# Patient Record
Sex: FEMALE | Race: WHITE | NOT HISPANIC OR LATINO | Employment: OTHER | URBAN - METROPOLITAN AREA
[De-identification: names, ages, dates, MRNs, and addresses within clinical notes are randomized per-mention and may not be internally consistent; named-entity substitution may affect disease eponyms.]

---

## 2024-01-30 ENCOUNTER — DOCUMENTATION ONLY (OUTPATIENT)
Dept: OTHER | Facility: CLINIC | Age: 74
End: 2024-01-30

## 2024-01-31 ENCOUNTER — DOCUMENTATION ONLY (OUTPATIENT)
Dept: GERIATRICS | Facility: CLINIC | Age: 74
End: 2024-01-31

## 2024-02-05 PROBLEM — K59.00 CONSTIPATION: Status: ACTIVE | Noted: 2021-09-29

## 2024-02-05 PROBLEM — F33.1 MODERATE EPISODE OF RECURRENT MAJOR DEPRESSIVE DISORDER (H): Status: ACTIVE | Noted: 2022-05-16

## 2024-02-05 PROBLEM — F41.9 ANXIETY: Status: ACTIVE | Noted: 2019-05-16

## 2024-02-05 PROBLEM — R05.3 CHRONIC COUGH: Status: ACTIVE | Noted: 2020-09-15

## 2024-02-05 PROBLEM — L72.9 SKIN CYST: Status: ACTIVE | Noted: 2017-07-21

## 2024-02-05 PROBLEM — R10.11 RUQ PAIN: Status: RESOLVED | Noted: 2023-05-02 | Resolved: 2024-02-05

## 2024-02-05 PROBLEM — R79.89 LOW VITAMIN B12 LEVEL: Status: ACTIVE | Noted: 2020-09-15

## 2024-02-05 PROBLEM — R63.4 WEIGHT LOSS: Status: ACTIVE | Noted: 2019-05-16

## 2024-02-05 PROBLEM — R13.10 DYSPHAGIA: Status: ACTIVE | Noted: 2022-08-17

## 2024-02-05 PROBLEM — G30.1 ALZHEIMER'S TYPE DEMENTIA WITH LATE ONSET WITHOUT BEHAVIORAL DISTURBANCE (H): Status: ACTIVE | Noted: 2020-06-02

## 2024-02-05 PROBLEM — N94.89 PELVIC CONGESTION: Status: ACTIVE | Noted: 2021-10-13

## 2024-02-05 PROBLEM — F02.80 ALZHEIMER'S TYPE DEMENTIA WITH LATE ONSET WITHOUT BEHAVIORAL DISTURBANCE (H): Status: ACTIVE | Noted: 2020-06-02

## 2024-02-05 PROBLEM — R73.01 ELEVATED FASTING BLOOD SUGAR: Status: ACTIVE | Noted: 2020-09-08

## 2024-02-05 PROBLEM — K64.8 INTERNAL HEMORRHOIDS: Status: ACTIVE | Noted: 2021-09-29

## 2024-02-05 PROBLEM — I10 PRIMARY HYPERTENSION: Status: ACTIVE | Noted: 2021-09-29

## 2024-02-06 ENCOUNTER — ASSISTED LIVING VISIT (OUTPATIENT)
Dept: GERIATRICS | Facility: CLINIC | Age: 74
End: 2024-02-06
Payer: COMMERCIAL

## 2024-02-06 DIAGNOSIS — R73.03 PREDIABETES: ICD-10-CM

## 2024-02-06 DIAGNOSIS — F41.9 ANXIETY: ICD-10-CM

## 2024-02-06 DIAGNOSIS — G30.1 ALZHEIMER'S TYPE DEMENTIA WITH LATE ONSET WITHOUT BEHAVIORAL DISTURBANCE (H): Primary | ICD-10-CM

## 2024-02-06 DIAGNOSIS — E55.9 VITAMIN D DEFICIENCY: ICD-10-CM

## 2024-02-06 DIAGNOSIS — R13.10 DYSPHAGIA, UNSPECIFIED TYPE: ICD-10-CM

## 2024-02-06 DIAGNOSIS — R63.4 WEIGHT LOSS: ICD-10-CM

## 2024-02-06 DIAGNOSIS — F33.1 MODERATE EPISODE OF RECURRENT MAJOR DEPRESSIVE DISORDER (H): ICD-10-CM

## 2024-02-06 DIAGNOSIS — E78.5 HYPERLIPIDEMIA, UNSPECIFIED HYPERLIPIDEMIA TYPE: ICD-10-CM

## 2024-02-06 DIAGNOSIS — I10 PRIMARY HYPERTENSION: ICD-10-CM

## 2024-02-06 DIAGNOSIS — F02.80 ALZHEIMER'S TYPE DEMENTIA WITH LATE ONSET WITHOUT BEHAVIORAL DISTURBANCE (H): Primary | ICD-10-CM

## 2024-02-06 PROCEDURE — 99345 HOME/RES VST NEW HIGH MDM 75: CPT | Performed by: NURSE PRACTITIONER

## 2024-02-06 RX ORDER — ACETAMINOPHEN 500 MG
1000 TABLET ORAL EVERY 8 HOURS PRN
COMMUNITY
Start: 2024-01-29 | End: 2024-04-03 | Stop reason: DRUGHIGH

## 2024-02-06 RX ORDER — ATORVASTATIN CALCIUM 40 MG/1
1 TABLET, FILM COATED ORAL AT BEDTIME
COMMUNITY
Start: 2023-11-16 | End: 2024-05-09

## 2024-02-06 RX ORDER — AMLODIPINE BESYLATE 2.5 MG/1
1 TABLET ORAL EVERY EVENING
COMMUNITY
Start: 2023-12-19 | End: 2024-05-09

## 2024-02-06 RX ORDER — DOCUSATE SODIUM 100 MG/1
1 TABLET ORAL DAILY
COMMUNITY
Start: 2023-11-16 | End: 2024-05-01

## 2024-02-06 RX ORDER — MEMANTINE HYDROCHLORIDE 10 MG/1
1 TABLET ORAL 2 TIMES DAILY
COMMUNITY
Start: 2024-01-12 | End: 2024-05-15

## 2024-02-06 RX ORDER — ASPIRIN 81 MG/1
1 TABLET ORAL DAILY
COMMUNITY
Start: 2023-11-16 | End: 2024-05-09

## 2024-02-06 RX ORDER — HYDROCORTISONE ACETATE 25 MG/1
25 SUPPOSITORY RECTAL EVERY 4 HOURS PRN
COMMUNITY
Start: 2022-11-17 | End: 2024-04-03

## 2024-02-06 RX ORDER — DONEPEZIL HYDROCHLORIDE 5 MG/1
1 TABLET, FILM COATED ORAL AT BEDTIME
COMMUNITY
Start: 2023-11-16 | End: 2024-02-07

## 2024-02-06 RX ORDER — BUSPIRONE HYDROCHLORIDE 5 MG/1
1 TABLET ORAL 3 TIMES DAILY
COMMUNITY
Start: 2023-11-16 | End: 2024-04-09

## 2024-02-06 NOTE — LETTER
2/6/2024        RE: Joyce Figueroa  C/o Randy Figueora  [address] [facility] [facility] [id number]        [facility] GERIATRICS    PRIMARY CARE PROVIDER AND CLINIC:  CATIA Dougherty [facility], [address] / [facility] [address]  Chief Complaint   Patient presents with    [facility] Medical Record Number:  [id number]  [id number]    Joyce Figueroa  is a 73 year old  (1950), living in above facility since 3/27/23 and now choosing to change PCPs to [facility]. .     HPI:      PMH: dementia, anxiety, HTN, weight loss, dysphagia, prediabetes    Last PCP visit in 09/2023 for annual wellness visit.       Today's concerns:    RA staff report patient not sleeping well, wandering at night. Still in her room today, didn't eat breakfast or lunch. Staff endorse concern w/urinary frequency.    During exam, patient seen sitting in recliner w/ Randy (spouse) present. Earlier patient seen in hallway ambulating independently with unsteady gait, holding onto side rails. HPI limited due to dementia. Denies pain or concerns today. Admits to fatigue. Denies issues with bowel or bladder. Denies chest pain, SOB, headache, syncope.  Randy able to provide additional HPI. Randy reports increase physical decline over the past several weeks and well as ongoing weight loss and insomnia. Takes multiple naps during the day. States patient does eat meals in dining room. Needs cues with meals and ADLs.       CODE STATUS/ADVANCE DIRECTIVES DISCUSSION:  DNR/DNI  ALLERGIES:   Allergies   Allergen Reactions    Escitalopram Nausea      PAST MEDICAL HISTORY:   Past Medical History:   Diagnosis Date    Alzheimer's type dementia with late onset without behavioral disturbance (H) 06/02/2020    Formatting of this note might be different from the original.   Alzheimer type dementia, late onset with anxiety.  MOCA 15/30 August 2022.  She has a several year history of cognitive and memory decline which has worsened to the point where she can no longer perform activities that she used to do easily such as use of the television, microwave, and computer.  She remains independent of ADLs with th    Anxiety 05/16/2019    Formatting of this note might be different from the original.   Did not tolerate escitalopram   Significant travel anxiety      Last Assessment & Plan:    Formatting of this note might be different from the original.   Increased travel anxiety. Anxiety adequately managed most of time   - may increase buspirone to 5-10 mg every 8 hours as needed for anxiety. Currently taking 5 mg twice daily schedu    Dysphagia 08/17/2022    Formatting of this note might be different from the original.   History of benign esophageal stenosis s/p dilation in past      Last Assessment & Plan:    Formatting of this note might be different from the original.   Occasionally coughs when eating. If this progresses, advise swallow evaluation. Chew food thoroughly.  Favor thickened over liquid foods.    GERD (gastroesophageal reflux disease) 11/03/2014    Formatting of this note might be different from the original.   Impression - 29Msm9956: Abdominal symptoms have improved; Impression - 49Ssz5483: well controlled with lifestyle changes and as needed ranitidine; Impression - 31Ogp0695: takes med PRn after making lifestyle changes; now takes it only every 2 weeks; Impression - 00Ahf0417: decrease prevacid to 20    Hyperlipidemia 08/10/2013    Last Assessment & Plan:    Formatting of this note might be different from the original.   Managed with atorvastatin 40 mg once daily. Check cholesterol with labs today    Loss of smell 06/02/2016    Formatting of this note might be different from the original.   Impression - 34Hcp2696: We discussed the differential diagnosis for this.  She declined an UPSIT test today.  She was advised on safety issues in the home  (CO/smoke detectors).    Mixed conductive and sensorineural hearing loss of left ear 10/17/2016    Moderate episode of recurrent major depressive disorder (H) 05/16/2022    Last Assessment & Plan:    Formatting of this note might be different from the original.   Sounds like sertraline to 50 mg once daily has been beneficial. Continue at same dosing.    Otosclerosis, left 06/02/2016    Formatting of this note might be different from the original.   Impression - 24Fib0010: Her hearing is stable.  She was advised to get a new hearing aid for the left ear.; Impression - 50Ohv3016: She has not had surgery for this.  She did have an exploratory tympanotomy in the past.  She currently wears a hearing aid and we will plan to retest her hearing the next time she comes in, assuming the i    Pelvic congestion 10/13/2021    Last Assessment & Plan:    Formatting of this note might be different from the original.   We discussed finding on CT. Joyce is not having any convincing symptoms to correlate. She and her  elect to monitor at this time. I agree.    Primary hypertension 09/29/2021    Last Assessment & Plan:    Formatting of this note might be different from the original.   Blood pressure looks great. Continue amlodipine 2.5 mg once daily. Screening labs updated today    RUQ pain 05/02/2023    Last Assessment & Plan:    Formatting of this note might be different from the original.   Reports acute RUQ pain today. Quite sensitive on exam. Had been using MiraLAX daily for constipation but not recently. Possible constipation although has been regular by report.   - abdominal film and labs today   - restart MiraLAX one serving once daily with a full glass of water      PAST SURGICAL HISTORY:   has no past surgical history on file.  FAMILY HISTORY: family history includes Alzheimer Disease in her mother and sister; Bladder Cancer in her brother; Hyperlipidemia in her father.  SOCIAL HISTORY:   reports that she has never  "smoked. She has never used smokeless tobacco. She reports that she does not currently use alcohol. She reports that she does not use drugs.  Patient's living condition: lives in an assisted living facility    Post Discharge Medication Reconciliation Status:   MED REC REQUIRED  Post Medication Reconciliation Status: discharge medications reconciled and changed, per note/orders     Current Outpatient Medications   Medication Sig    acetaminophen (TYLENOL) 500 MG tablet Take 1,000 mg by mouth every 8 hours as needed    amLODIPine (NORVASC) 2.5 MG tablet Take 1 tablet by mouth every evening    aspirin 81 MG EC tablet Take 1 tablet by mouth daily    atorvastatin (LIPITOR) 40 MG tablet Take 1 tablet by mouth at bedtime    busPIRone (BUSPAR) 5 MG tablet Take 1 tablet by mouth 3 times daily    diclofenac (VOLTAREN) 1 % topical gel Apply 4 g topically 3 times daily May also apply daily PRN    docusate sodium (DOK) 100 MG tablet Take 1 tablet by mouth daily    hydrocortisone (ANUSOL-HC) 25 MG suppository Place 25 mg rectally every 4 hours as needed    memantine (NAMENDA) 10 MG tablet Take 1 tablet by mouth 2 times daily    mirtazapine (REMERON) 7.5 MG tablet Take 1 tablet (7.5 mg) by mouth at bedtime    sertraline (ZOLOFT) 50 MG tablet Take 1 tablet by mouth daily     No current facility-administered medications for this visit.       ROS:  Unobtainable secondary to cognitive impairment.       Vitals:  /71   Pulse 66   Temp 97  F (36.1  C)   Resp 16   Ht 1.676 m (5' 6\")   Wt 57.3 kg (126 lb 6.4 oz)   BMI 20.40 kg/m    Exam:  GENERAL APPEARANCE:  Alert, in no distress, thin, cooperative  ENT:  Mouth and posterior oropharynx normal, moist mucous membranes, [place]  EYES:  EOM, conjunctivae, lids, pupils and irises normal, PERRL  RESP:  respiratory effort and palpation of chest normal, lungs clear to auscultation , no respiratory distress  CV:  regular rate and rhythm, no murmur, rub, or gallop, no edema  ABDOMEN:  normal " bowel sounds, soft, nontender, no guarding or rebound  M/S:   Gait and station abnormal, unsteady gait.  SKIN:  Inspection of skin and subcutaneous tissue baseline, Palpation of skin and subcutaneous tissue baseline  NEURO:   Cranial nerves 2-12 are normal tested and grossly at patient's baseline, Examination of sensation by touch normal  PSYCH:  oriented to self, memory impaired       Lab/Diagnostic data:  Recent labs in [facility] reviewed by me today.         ASSESSMENT/PLAN:    (G30.1,  F02.80) Alzheimer's type dementia with late onset without behavioral disturbance (H)  (primary encounter diagnosis)  (F33.1) Moderate episode of recurrent major depressive disorder (H)  (F41.9) Anxiety  (R63.4) Weight loss  Comment: Chronic dementia w/anxiety, depression and ongoing weight loss, insomnia and urinary frequency. Ambulates w/o assistive device, unsteady gait. High fall risk.  Plan:   - Discontinue aricept   - Add remeron 7.5mg at bedtime dx insomnia, dementia w/anxiety  - Continue namenda, buspar, sertraline  - Check UA/UC dx urinary incontinence  - Check CBC, CMP, TSH, vitamin D level, A1c on 2/13/24 dx prediabetes, fatigue, dementia with anxiety, weight loss  - Check weight, vital signs; updated provider with results   - Monitor changes in mood or behaviors  - Continue supportive services at [facility] memory care unit  - Randy confirms code status: DNR/DNI  - Reviewed patient status and treatment plan with Randy (spouse)    (I10) Primary hypertension  Comment: BP controlled  Plan:   - Continue amlodipine    (E78.5) Hyperlipidemia, unspecified hyperlipidemia type  Comment: Chronic  Plan:   - Continue lipitor    (E55.9) Vitamin D deficiency  Comment: Chronic  Plan:   - Check vitamin D level    (R13.10) Dysphagia, unspecified type  Comment: Hx dysphagia with mild esophageal stenosis s/p dilation in the past.   Plan:   - Continue regular diet    (R73.03) Prediabetes  Comment: Chronic. Last A1c 5.7% on 9/13/23.  Plan:   -  Check A1c      Orders:  - Discontinue aricept   - Check UA/UC dx urinary incontinence  - Check CBC, CMP, TSH, vitamin D level, A1c on 24 dx prediabetes, fatigue, dementia with anxiety, weight loss  - Check weight, vital signs; updated provider with results   - Add remeron 7.5mg at bedtime dx insomnia, dementia w/anxiety          Total time spent during today's visit was 75 mins including patient visit and review of past records. Time also spent coordinating care with RN staff regarding patient status and changes to treatment plan.     Electronically signed by:  CATIA Dougherty CNP                      [facility]   2024     Name: Joyce Figueroa   : 1950       Orders:  - Discontinue aricept   - Check UA/UC dx urinary incontinence  - Check CBC, CMP, TSH, vitamin D level, A1c on 24 dx prediabetes, fatigue, dementia with anxiety, weight loss  - Check weight, vital signs; updated provider with results   - Add remeron 7.5mg at bedtime dx insomnia, dementia w/anxiety        Electronically signed by  CATIA Dougherty CNP on 2024 at 12:38 PM          Sincerely,        CATIA Dougherty CNP

## 2024-02-06 NOTE — PROGRESS NOTES
[facility] GERIATRICS    PRIMARY CARE PROVIDER AND CLINIC:  CATIA Dougherty CNP, [address] [facility] [address] [facility] / [facility] [address]  Chief Complaint   Patient presents with    [facility] Medical Record Number:  [id number]  Place of Service where encounter took place:  [facility]) [[id number]]    Joyce Figueroa  is a 73 year old  (1950), living in above facility since 3/27/23 and now choosing to change PCPs to [facility]. .     HPI:      PMH: dementia, anxiety, HTN, weight loss, dysphagia, prediabetes    Last PCP visit in 09/2023 for annual wellness visit.       Today's concerns:    RA staff report patient not sleeping well, wandering at night. Still in her room today, didn't eat breakfast or lunch. Staff endorse concern w/urinary frequency.    During exam, patient seen sitting in recliner w/ Randy (spouse) present. Earlier patient seen in hallway ambulating independently with unsteady gait, holding onto side rails. HPI limited due to dementia. Denies pain or concerns today. Admits to fatigue. Denies issues with bowel or bladder. Denies chest pain, SOB, headache, syncope.  Randy able to provide additional HPI. Randy reports increase physical decline over the past several weeks and well as ongoing weight loss and insomnia. Takes multiple naps during the day. States patient does eat meals in dining room. Needs cues with meals and ADLs.       CODE STATUS/ADVANCE DIRECTIVES DISCUSSION:  DNR/DNI  ALLERGIES:   Allergies   Allergen Reactions    Escitalopram Nausea      PAST MEDICAL HISTORY:   Past Medical History:   Diagnosis Date    Alzheimer's type dementia with late onset without behavioral disturbance (H) 06/02/2020    Formatting of this note might be different from the original.   Alzheimer type dementia, late onset with anxiety.  MOCA 15/30 August 2022. She has a several year history of cognitive and memory decline which has worsened to the point where she can no longer  perform activities that she used to do easily such as use of the television, microwave, and computer.  She remains independent of ADLs with th    Anxiety 05/16/2019    Formatting of this note might be different from the original.   Did not tolerate escitalopram   Significant travel anxiety      Last Assessment & Plan:    Formatting of this note might be different from the original.   Increased travel anxiety. Anxiety adequately managed most of time   - may increase buspirone to 5-10 mg every 8 hours as needed for anxiety. Currently taking 5 mg twice daily schedu    Dysphagia 08/17/2022    Formatting of this note might be different from the original.   History of benign esophageal stenosis s/p dilation in past      Last Assessment & Plan:    Formatting of this note might be different from the original.   Occasionally coughs when eating. If this progresses, advise swallow evaluation. Chew food thoroughly.  Favor thickened over liquid foods.    GERD (gastroesophageal reflux disease) 11/03/2014    Formatting of this note might be different from the original.   Impression - [id number]: Abdominal symptoms have improved; Impression - 45Qgi5650: well controlled with lifestyle changes and as needed ranitidine; Impression - 07Sbr7067: takes med PRn after making lifestyle changes; now takes it only every 2 weeks; Impression - 26Roz5437: decrease prevacid to 20    Hyperlipidemia 08/10/2013    Last Assessment & Plan:    Formatting of this note might be different from the original.   Managed with atorvastatin 40 mg once daily. Check cholesterol with labs today    Loss of smell 06/02/2016    Formatting of this note might be different from the original.   Impression - 93Pvp6708: We discussed the differential diagnosis for this.  She declined an UPSIT test today.  She was advised on safety issues in the home (CO/smoke detectors).    Mixed conductive and sensorineural hearing loss of left ear 10/17/2016    Moderate episode of  recurrent major depressive disorder (H) 05/16/2022    Last Assessment & Plan:    Formatting of this note might be different from the original.   Sounds like sertraline to 50 mg once daily has been beneficial. Continue at same dosing.    Otosclerosis, left 06/02/2016    Formatting of this note might be different from the original.   Impression - 73Euc3369: Her hearing is stable.  She was advised to get a new hearing aid for the left ear.; Impression - 63Dxs0261: She has not had surgery for this.  She did have an exploratory tympanotomy in the past.  She currently wears a hearing aid and we will plan to retest her hearing the next time she comes in, assuming the i    Pelvic congestion 10/13/2021    Last Assessment & Plan:    Formatting of this note might be different from the original.   We discussed finding on CT. Joyce is not having any convincing symptoms to correlate. She and her  elect to monitor at this time. I agree.    Primary hypertension 09/29/2021    Last Assessment & Plan:    Formatting of this note might be different from the original.   Blood pressure looks great. Continue amlodipine 2.5 mg once daily. Screening labs updated today    RUQ pain 05/02/2023    Last Assessment & Plan:    Formatting of this note might be different from the original.   Reports acute RUQ pain today. Quite sensitive on exam. Had been using MiraLAX daily for constipation but not recently. Possible constipation although has been regular by report.   - abdominal film and labs today   - restart MiraLAX one serving once daily with a full glass of water      PAST SURGICAL HISTORY:   has no past surgical history on file.  FAMILY HISTORY: family history includes Alzheimer Disease in her mother and sister; Bladder Cancer in her brother; Hyperlipidemia in her father.  SOCIAL HISTORY:   reports that she has never smoked. She has never used smokeless tobacco. She reports that she does not currently use alcohol. She reports that she  "does not use drugs.  Patient's living condition: lives in an assisted living facility    Post Discharge Medication Reconciliation Status:   MED REC REQUIRED  Post Medication Reconciliation Status: discharge medications reconciled and changed, per note/orders     Current Outpatient Medications   Medication Sig    acetaminophen (TYLENOL) 500 MG tablet Take 1,000 mg by mouth every 8 hours as needed    amLODIPine (NORVASC) 2.5 MG tablet Take 1 tablet by mouth every evening    aspirin 81 MG EC tablet Take 1 tablet by mouth daily    atorvastatin (LIPITOR) 40 MG tablet Take 1 tablet by mouth at bedtime    busPIRone (BUSPAR) 5 MG tablet Take 1 tablet by mouth 3 times daily    diclofenac (VOLTAREN) 1 % topical gel Apply 4 g topically 3 times daily May also apply daily PRN    docusate sodium (DOK) 100 MG tablet Take 1 tablet by mouth daily    hydrocortisone (ANUSOL-HC) 25 MG suppository Place 25 mg rectally every 4 hours as needed    memantine (NAMENDA) 10 MG tablet Take 1 tablet by mouth 2 times daily    mirtazapine (REMERON) 7.5 MG tablet Take 1 tablet (7.5 mg) by mouth at bedtime    sertraline (ZOLOFT) 50 MG tablet Take 1 tablet by mouth daily     No current facility-administered medications for this visit.       ROS:  Unobtainable secondary to cognitive impairment.       Vitals:  /71   Pulse 66   Temp 97  F (36.1  C)   Resp 16   Ht 1.676 m (5' 6\")   Wt 57.3 kg (126 lb 6.4 oz)   BMI 20.40 kg/m    Exam:  GENERAL APPEARANCE:  Alert, in no distress, thin, cooperative  ENT:  Mouth and posterior oropharynx normal, moist mucous membranes, [place]  EYES:  EOM, conjunctivae, lids, pupils and irises normal, PERRL  RESP:  respiratory effort and palpation of chest normal, lungs clear to auscultation , no respiratory distress  CV:  regular rate and rhythm, no murmur, rub, or gallop, no edema  ABDOMEN:  normal bowel sounds, soft, nontender, no guarding or rebound  M/S:   Gait and station abnormal, unsteady gait.  SKIN:  " Inspection of skin and subcutaneous tissue baseline, Palpation of skin and subcutaneous tissue baseline  NEURO:   Cranial nerves 2-12 are normal tested and grossly at patient's baseline, Examination of sensation by touch normal  PSYCH:  oriented to self, memory impaired       Lab/Diagnostic data:  Recent labs in [facility] reviewed by me today.         ASSESSMENT/PLAN:    (G30.1,  F02.80) Alzheimer's type dementia with late onset without behavioral disturbance (H)  (primary encounter diagnosis)  (F33.1) Moderate episode of recurrent major depressive disorder (H)  (F41.9) Anxiety  (R63.4) Weight loss  Comment: Chronic dementia w/anxiety, depression and ongoing weight loss, insomnia and urinary frequency. Ambulates w/o assistive device, unsteady gait. High fall risk.  Plan:   - Discontinue aricept   - Add remeron 7.5mg at bedtime dx insomnia, dementia w/anxiety  - Continue namenda, buspar, sertraline  - Check UA/UC dx urinary incontinence  - Check CBC, CMP, TSH, vitamin D level, A1c on 2/13/24 dx prediabetes, fatigue, dementia with anxiety, weight loss  - Check weight, vital signs; updated provider with results   - Monitor changes in mood or behaviors  - Continue supportive services at [facility] memory care unit  - Randy confirms code status: DNR/DNI  - Reviewed patient status and treatment plan with Randy (spouse)    (I10) Primary hypertension  Comment: BP controlled  Plan:   - Continue amlodipine    (E78.5) Hyperlipidemia, unspecified hyperlipidemia type  Comment: Chronic  Plan:   - Continue lipitor    (E55.9) Vitamin D deficiency  Comment: Chronic  Plan:   - Check vitamin D level    (R13.10) Dysphagia, unspecified type  Comment: Hx dysphagia with mild esophageal stenosis s/p dilation in the past.   Plan:   - Continue regular diet    (R73.03) Prediabetes  Comment: Chronic. Last A1c 5.7% on 9/13/23.  Plan:   - Check A1c      Orders:  - Discontinue aricept   - Check UA/UC dx urinary incontinence  - Check CBC, CMP, TSH,  vitamin D level, A1c on 2/13/24 dx prediabetes, fatigue, dementia with anxiety, weight loss  - Check weight, vital signs; updated provider with results   - Add remeron 7.5mg at bedtime dx insomnia, dementia w/anxiety          Total time spent during today's visit was 75 mins including patient visit and review of past records. Time also spent coordinating care with RN staff regarding patient status and changes to treatment plan.     Electronically signed by:  CATIA Dougherty CNP

## 2024-02-07 RX ORDER — MIRTAZAPINE 7.5 MG/1
7.5 TABLET, FILM COATED ORAL AT BEDTIME
Qty: 30 TABLET | Refills: 11 | Status: SHIPPED | OUTPATIENT
Start: 2024-02-07 | End: 2024-04-09

## 2024-02-07 NOTE — PROGRESS NOTES
[facility] Geriatrics   2024     Name: Joyce Figueroa   : 1950       Orders:  - Discontinue aricept   - Check UA/UC dx urinary incontinence  - Check CBC, CMP, TSH, vitamin D level, A1c on 24 dx prediabetes, fatigue, dementia with anxiety, weight loss  - Check weight, vital signs; updated provider with results   - Add remeron 7.5mg at bedtime dx insomnia, dementia w/anxiety        Electronically signed by  CATIA Dougherty CNP on 2024 at 12:38 PM

## 2024-02-12 ENCOUNTER — TELEPHONE (OUTPATIENT)
Dept: GERIATRICS | Facility: CLINIC | Age: 74
End: 2024-02-12
Payer: COMMERCIAL

## 2024-02-12 ENCOUNTER — LAB REQUISITION (OUTPATIENT)
Dept: LAB | Facility: CLINIC | Age: 74
End: 2024-02-12
Payer: COMMERCIAL

## 2024-02-12 DIAGNOSIS — R63.4 ABNORMAL WEIGHT LOSS: ICD-10-CM

## 2024-02-12 DIAGNOSIS — R53.83 OTHER FATIGUE: ICD-10-CM

## 2024-02-12 DIAGNOSIS — F03.94 UNSPECIFIED DEMENTIA, UNSPECIFIED SEVERITY, WITH ANXIETY (H): ICD-10-CM

## 2024-02-12 DIAGNOSIS — R73.03 PREDIABETES: ICD-10-CM

## 2024-02-12 NOTE — TELEPHONE ENCOUNTER
[facility] Geriatrics Triage Nurse Telephone Encounter    Provider: CATIA Hilton CNP   Facility: [facility]  Facility Type:  AL    Caller: Shantelle  Call Back Number: [phone]    Allergies:    Allergies   Allergen Reactions    Escitalopram Nausea        Reason for call: Requesting to change current Diclofenac Gel QID to TID so service package cost isn't higher.     Verbal Order/Direction given by Provider:   - Change Diclofenac Gel 1% to Apply TID and daily PRN    Provider giving Order:  CATIA Hilton CNP     Verbal Order given to: Shantelle Lawson RN

## 2024-02-13 ENCOUNTER — LAB REQUISITION (OUTPATIENT)
Dept: LAB | Facility: CLINIC | Age: 74
End: 2024-02-13
Payer: COMMERCIAL

## 2024-02-13 DIAGNOSIS — R53.83 OTHER FATIGUE: ICD-10-CM

## 2024-02-13 DIAGNOSIS — R63.4 ABNORMAL WEIGHT LOSS: ICD-10-CM

## 2024-02-13 DIAGNOSIS — R73.03 PREDIABETES: ICD-10-CM

## 2024-02-14 LAB
ERYTHROCYTE [DISTWIDTH] IN BLOOD BY AUTOMATED COUNT: 13.8 % (ref 10–15)
HBA1C MFR BLD: 6.2 %
HCT VFR BLD AUTO: 36.5 % (ref 35–47)
HGB BLD-MCNC: 12 G/DL (ref 11.7–15.7)
MCH RBC QN AUTO: 30.8 PG (ref 26.5–33)
MCHC RBC AUTO-ENTMCNC: 32.9 G/DL (ref 31.5–36.5)
MCV RBC AUTO: 94 FL (ref 78–100)
PLATELET # BLD AUTO: 218 10E3/UL (ref 150–450)
RBC # BLD AUTO: 3.9 10E6/UL (ref 3.8–5.2)
WBC # BLD AUTO: 6.7 10E3/UL (ref 4–11)

## 2024-02-14 PROCEDURE — P9603 ONE-WAY ALLOW PRORATED MILES: HCPCS | Mod: ORL | Performed by: NURSE PRACTITIONER

## 2024-02-14 PROCEDURE — 85027 COMPLETE CBC AUTOMATED: CPT | Mod: ORL | Performed by: NURSE PRACTITIONER

## 2024-02-14 PROCEDURE — 36415 COLL VENOUS BLD VENIPUNCTURE: CPT | Mod: ORL | Performed by: NURSE PRACTITIONER

## 2024-02-14 PROCEDURE — 83036 HEMOGLOBIN GLYCOSYLATED A1C: CPT | Mod: ORL | Performed by: NURSE PRACTITIONER

## 2024-02-14 PROCEDURE — 82306 VITAMIN D 25 HYDROXY: CPT | Mod: ORL | Performed by: NURSE PRACTITIONER

## 2024-02-14 PROCEDURE — 80053 COMPREHEN METABOLIC PANEL: CPT | Mod: ORL | Performed by: NURSE PRACTITIONER

## 2024-02-14 PROCEDURE — 84443 ASSAY THYROID STIM HORMONE: CPT | Mod: ORL | Performed by: NURSE PRACTITIONER

## 2024-02-15 LAB
ALBUMIN SERPL BCG-MCNC: 4.2 G/DL (ref 3.5–5.2)
ALP SERPL-CCNC: 59 U/L (ref 40–150)
ALT SERPL W P-5'-P-CCNC: 15 U/L (ref 0–50)
ANION GAP SERPL CALCULATED.3IONS-SCNC: 11 MMOL/L (ref 7–15)
AST SERPL W P-5'-P-CCNC: 27 U/L (ref 0–45)
BILIRUB SERPL-MCNC: 0.4 MG/DL
BUN SERPL-MCNC: 14.2 MG/DL (ref 8–23)
CALCIUM SERPL-MCNC: 9.6 MG/DL (ref 8.8–10.2)
CHLORIDE SERPL-SCNC: 105 MMOL/L (ref 98–107)
CREAT SERPL-MCNC: 0.78 MG/DL (ref 0.51–0.95)
DEPRECATED HCO3 PLAS-SCNC: 27 MMOL/L (ref 22–29)
EGFRCR SERPLBLD CKD-EPI 2021: 79 ML/MIN/1.73M2
GLUCOSE SERPL-MCNC: 103 MG/DL (ref 70–99)
POTASSIUM SERPL-SCNC: 4.5 MMOL/L (ref 3.4–5.3)
PROT SERPL-MCNC: 6.7 G/DL (ref 6.4–8.3)
SODIUM SERPL-SCNC: 143 MMOL/L (ref 135–145)
TSH SERPL DL<=0.005 MIU/L-ACNC: 0.68 UIU/ML (ref 0.3–4.2)
VIT D+METAB SERPL-MCNC: 30 NG/ML (ref 20–50)

## 2024-02-25 VITALS
BODY MASS INDEX: 20.31 KG/M2 | TEMPERATURE: 97 F | HEART RATE: 66 BPM | RESPIRATION RATE: 16 BRPM | WEIGHT: 126.4 LBS | SYSTOLIC BLOOD PRESSURE: 126 MMHG | HEIGHT: 66 IN | DIASTOLIC BLOOD PRESSURE: 71 MMHG

## 2024-02-29 ENCOUNTER — TELEPHONE (OUTPATIENT)
Dept: GERIATRICS | Facility: CLINIC | Age: 74
End: 2024-02-29
Payer: COMMERCIAL

## 2024-02-29 NOTE — TELEPHONE ENCOUNTER
"[facility] Geriatrics Triage Nurse Telephone Encounter    Provider: CATIA Hilton CNP   Facility: [facility]  Facility Type:  [place]    Caller: Angelika  Call Back Number: [phone]    Allergies:    Allergies   Allergen Reactions    Escitalopram Nausea        Reason for call: Nurse is calling to report that patient came up to staff stating \"I wish I had new breathing\".  The nurse check VS and they were:  T=98.0, P=77, R=20, NO=237/97, O2=96%RA.  Lung sounds are CTA.  No edema noted.  No c/o SOB.  Patient is walking the unit per usual.  Patient takes Amlodipine Q PM(7pm).      Verbal Order/Direction given by Provider: Check VS BID x 4 days and update PCP.      Provider giving Order:  Dedrick Duncan MD    Verbal Order given to: Angelika Schaefer RN      "

## 2024-02-29 NOTE — TELEPHONE ENCOUNTER
[facility] Triage Nurse Telephone Encounter    Provider: CATIA Hilton CNP   Facility: [facility]  Facility Type:  AL    Caller: Nurse  Call Back Number:     Allergies:    Allergies   Allergen Reactions    Escitalopram Nausea        Reason for call: Nurse called to report that she noticed an order for UA/UC was given about 2 weeks ago but urine sample never collected.  Patient is at baseline and not complaining of any urinary symptoms.  Nurse states that patient is also very difficult in collecting a urine sample as she will remove the collection hat.  Nurse is wondering if the order can be discontinued?     Verbal Order/Direction given by Provider: Discontinue order for UA/UC.      Provider giving Order:  Dedrick Duncan MD    Verbal Order given to: Nurse    Lily Olson RN

## 2024-03-22 ENCOUNTER — APPOINTMENT (OUTPATIENT)
Dept: GERIATRICS | Facility: CLINIC | Age: 74
End: 2024-03-22
Payer: COMMERCIAL

## 2024-04-02 NOTE — PROGRESS NOTES
"[facility] GERIATRICS    Chief Complaint   Patient presents with    RECHECK     HPI:  Joyce Figueroa is a 73 year old  (1950), who is being seen today for an episodic care visit at: [facility])(FGS) [[id number]].       Today's concern is:     RN staff report concern regarding increased limping to RLE, unclear if patient is having R hip pain secondary to dementia. No recent reports of falls. Staff also report concern regarding weight loss due to inability for patient to sit for meals due to wandering behaviors.     During exam, patient seen ambulating in hallways with limp to RLE. HPI limited due to dementia. Denies pain or concerns today. Unable to verbalize if having a good appetite. Denies issues with bowel or bladder. Denies chest pain, SOB, headache, syncope.      Allergies, and PMH/PSH reviewed in EPIC today.    REVIEW OF SYSTEMS:  Limited secondary to cognitive impairment but today pt reports no concerns      Objective:   /68   Pulse 69   Temp 97.6  F (36.4  C)   Resp 16   Ht 1.676 m (5' 6\")   Wt 53.5 kg (118 lb)   SpO2 97%   BMI 19.05 kg/m    GENERAL APPEARANCE:  Alert, in no distress, thin, cooperative  RESP:  respiratory effort and palpation of chest normal, lungs clear to auscultation , no respiratory distress  CV:  regular rate and rhythm, no murmur, rub, or gallop, no edema  ABDOMEN:  normal bowel sounds, soft, nontender, no guarding or rebound  M/S:   Gait and station abnormal, unsteady gait. Limping to the right.  SKIN:  Inspection of skin and subcutaneous tissue baseline, Palpation of skin and subcutaneous tissue baseline  NEURO:   Cranial nerves 2-12 are normal tested and grossly at patient's baseline, Examination of sensation by touch normal  PSYCH:  oriented to self, memory impaired     Wt Readings from Last 4 Encounters:   04/02/24 53.5 kg (118 lb)   02/06/24 57.3 kg (126 lb 6.4 oz)       Recent labs in [facility] reviewed by me today.  Most Recent 3 CBC's:  Recent Labs "   Lab Test 02/14/24  1120   WBC 6.7   HGB 12.0   MCV 94        Most Recent 3 BMP's:  Recent Labs   Lab Test 02/14/24  1120      POTASSIUM 4.5   CHLORIDE 105   CO2 27   BUN 14.2   CR 0.78   ANIONGAP 11   LARRY 9.6   *     Most Recent 2 LFT's:  Recent Labs   Lab Test 02/14/24  1120   AST 27   ALT 15   ALKPHOS 59   BILITOTAL 0.4     Most Recent TSH and T4:  Recent Labs   Lab Test 02/14/24  1120   TSH 0.68     Most Recent Hemoglobin A1c:  Recent Labs   Lab Test 02/14/24  1120   A1C 6.2*      Latest Reference Range & Units 02/14/24 11:20   Vitamin D, Total (25-Hydroxy) 20 - 50 ng/mL 30         Assessment/Plan:    (G30.1,  F02.80) Alzheimer's type dementia with late onset without behavioral disturbance (H)  (primary encounter diagnosis)  (F33.1) Moderate episode of recurrent major depressive disorder (H)  (F41.9) Anxiety  Comment: Chronic Alzheimer's dementia with anxiety associated behaviors/wandering, hx depression.   Plan:   - Continue remeron, namenda, buspar, sertraline   - Monitor changes in mood or behaviors  - Continue supportive services at [facility] memory care unit  - Left voicemail with Randy (spouse) to review patient status and treatment plan  UPDATE: Reviewed patient status and treatment plan with Sherin (daughter). Discussed current weight loss and R sided limping. Plan to obtain XR R hip. Also reviewed goals of care are comfort focused, would be interested in hospice informational meeting. Plan to review with family.     (M23.744) Hip pain, right  Comment: R hip pain as evidenced by limping/unclear pain associated behaviors   Plan:   - XR R hip   - Discontinue current tylenol PRN orders  - Add tylenol 1000mg TID at 8AM, 2PM, 7PM dx R hip pain  - Add tylenol 1000mg at bedtime PRN dx pain    (E11.9) Type 2 diabetes mellitus without complication, without long-term current use of insulin (H)  Comment: Controlled. Last A1c 6.2% in 02/2024.   Plan:   - Recheck A1c in 4-6 months (07/2024)    (I10)  Primary hypertension  Comment: Controlled  Plan:   - Continue amlodipine  - Monitor BP/HR during routine visits    (R63.4) Weight loss  Comment: Chronic weight loss associated with dementia and wandering behaviors  Plan:   - Consider protein supplement  - Encourage intake w/meals; consider increased supervision/cues w/meals  - Continue remeron      Orders:  - Please fax POLST and health care directive to honoring choices   - Discontinue current tylenol PRN orders  - Add tylenol 1000mg TID at 8AM, 2PM, 7PM dx R hip pain  - Add tylenol 1000mg at bedtime PRN dx pain  - Discontinue hydrocortisone rectal suppository PRN  - Add to docusate sodium: hold if loose stools dx constipation      Electronically signed by: CATIA Dougherty CNP

## 2024-04-03 ENCOUNTER — ASSISTED LIVING VISIT (OUTPATIENT)
Dept: GERIATRICS | Facility: CLINIC | Age: 74
End: 2024-04-03
Payer: COMMERCIAL

## 2024-04-03 VITALS
HEIGHT: 66 IN | SYSTOLIC BLOOD PRESSURE: 111 MMHG | DIASTOLIC BLOOD PRESSURE: 68 MMHG | HEART RATE: 69 BPM | WEIGHT: 118 LBS | BODY MASS INDEX: 18.96 KG/M2 | TEMPERATURE: 97.6 F | OXYGEN SATURATION: 97 % | RESPIRATION RATE: 16 BRPM

## 2024-04-03 DIAGNOSIS — E11.9 TYPE 2 DIABETES MELLITUS WITHOUT COMPLICATION, WITHOUT LONG-TERM CURRENT USE OF INSULIN (H): ICD-10-CM

## 2024-04-03 DIAGNOSIS — I10 PRIMARY HYPERTENSION: ICD-10-CM

## 2024-04-03 DIAGNOSIS — F41.9 ANXIETY: ICD-10-CM

## 2024-04-03 DIAGNOSIS — G30.1 ALZHEIMER'S TYPE DEMENTIA WITH LATE ONSET WITHOUT BEHAVIORAL DISTURBANCE (H): Primary | ICD-10-CM

## 2024-04-03 DIAGNOSIS — F33.1 MODERATE EPISODE OF RECURRENT MAJOR DEPRESSIVE DISORDER (H): ICD-10-CM

## 2024-04-03 DIAGNOSIS — M25.551 HIP PAIN, RIGHT: ICD-10-CM

## 2024-04-03 DIAGNOSIS — R63.4 WEIGHT LOSS: ICD-10-CM

## 2024-04-03 DIAGNOSIS — F02.80 ALZHEIMER'S TYPE DEMENTIA WITH LATE ONSET WITHOUT BEHAVIORAL DISTURBANCE (H): Primary | ICD-10-CM

## 2024-04-03 PROCEDURE — 99349 HOME/RES VST EST MOD MDM 40: CPT | Performed by: NURSE PRACTITIONER

## 2024-04-03 RX ORDER — ACETAMINOPHEN 500 MG
TABLET ORAL
Qty: 120 TABLET | Refills: 11 | Status: SHIPPED | OUTPATIENT
Start: 2024-04-03

## 2024-04-03 NOTE — LETTER
"    4/3/2024        RE: Joyce Figueroa  C/o Randy Figueroa  [address] [address]        [facility] GERIATRICS    Chief Complaint   Patient presents with     RECHECK     HPI:  Joyce Figueroa is a 73 year old  (1950), who is being seen today for an episodic care visit at: [facility])(FGS) [[id number]].       Today's concern is:     RN staff report concern regarding increased limping to RLE, unclear if patient is having R hip pain secondary to dementia. No recent reports of falls. Staff also report concern regarding weight loss due to inability for patient to sit for meals due to wandering behaviors.     During exam, patient seen ambulating in hallways with limp to RLE. HPI limited due to dementia. Denies pain or concerns today. Unable to verbalize if having a good appetite. Denies issues with bowel or bladder. Denies chest pain, SOB, headache, syncope.      Allergies, and PMH/PSH reviewed in [facility] today.    REVIEW OF SYSTEMS:  Limited secondary to cognitive impairment but today pt reports no concerns      Objective:   /68   Pulse 69   Temp 97.6  F (36.4  C)   Resp 16   Ht 1.676 m (5' 6\")   Wt 53.5 kg (118 lb)   SpO2 97%   BMI 19.05 kg/m    GENERAL APPEARANCE:  Alert, in no distress, thin, cooperative  RESP:  respiratory effort and palpation of chest normal, lungs clear to auscultation , no respiratory distress  CV:  regular rate and rhythm, no murmur, rub, or gallop, no edema  ABDOMEN:  normal bowel sounds, soft, nontender, no guarding or rebound  M/S:   Gait and station abnormal, unsteady gait. Limping to the right.  SKIN:  Inspection of skin and subcutaneous tissue baseline, Palpation of skin and subcutaneous tissue baseline  NEURO:   Cranial nerves 2-12 are normal tested and grossly at patient's baseline, Examination of sensation by touch normal  PSYCH:  oriented to self, memory impaired     Wt Readings from Last 4 Encounters:   04/02/24 53.5 kg (118 lb) "   02/06/24 57.3 kg (126 lb 6.4 oz)       Recent labs in EPIC reviewed by me today.  Most Recent 3 CBC's:  Recent Labs   Lab Test 02/14/24  1120   WBC 6.7   HGB 12.0   MCV 94        Most Recent 3 BMP's:  Recent Labs   Lab Test 02/14/24  1120      POTASSIUM 4.5   CHLORIDE 105   CO2 27   BUN 14.2   CR 0.78   ANIONGAP 11   LARRY 9.6   *     Most Recent 2 LFT's:  Recent Labs   Lab Test 02/14/24  1120   AST 27   ALT 15   ALKPHOS 59   BILITOTAL 0.4     Most Recent TSH and T4:  Recent Labs   Lab Test 02/14/24  1120   TSH 0.68     Most Recent Hemoglobin A1c:  Recent Labs   Lab Test 02/14/24  1120   A1C 6.2*      Latest Reference Range & Units 02/14/24 11:20   Vitamin D, Total (25-Hydroxy) 20 - 50 ng/mL 30         Assessment/Plan:    (G30.1,  F02.80) Alzheimer's type dementia with late onset without behavioral disturbance (H)  (primary encounter diagnosis)  (F33.1) Moderate episode of recurrent major depressive disorder (H)  (F41.9) Anxiety  Comment: Chronic Alzheimer's dementia with anxiety associated behaviors/wandering, hx depression.   Plan:   - Continue remeron, namenda, buspar, sertraline   - Monitor changes in mood or behaviors  - Continue supportive services at [facility] memory care unit  - Left voicemail with Randy (spouse) to review patient status and treatment plan  UPDATE: Reviewed patient status and treatment plan with Sherin (daughter). Discussed current weight loss and R sided limping. Plan to obtain XR R hip. Also reviewed goals of care are comfort focused, would be interested in hospice informational meeting. Plan to review with family.     (P23.381) Hip pain, right  Comment: R hip pain as evidenced by limping/unclear pain associated behaviors   Plan:   - XR R hip   - Discontinue current tylenol PRN orders  - Add tylenol 1000mg TID at 8AM, 2PM, 7PM dx R hip pain  - Add tylenol 1000mg at bedtime PRN dx pain    (E11.9) Type 2 diabetes mellitus without complication, without long-term current use of  insulin (H)  Comment: Controlled. Last A1c 6.2% in 2024.   Plan:   - Recheck A1c in 4-6 months (2024)    (I10) Primary hypertension  Comment: Controlled  Plan:   - Continue amlodipine  - Monitor BP/HR during routine visits    (R63.4) Weight loss  Comment: Chronic weight loss associated with dementia and wandering behaviors  Plan:   - Consider protein supplement  - Encourage intake w/meals; consider increased supervision/cues w/meals  - Continue remeron      Orders:  - Please fax POLST and health care directive to honoring choices   - Discontinue current tylenol PRN orders  - Add tylenol 1000mg TID at 8AM, 2PM, 7PM dx R hip pain  - Add tylenol 1000mg at bedtime PRN dx pain  - Discontinue hydrocortisone rectal suppository PRN  - Add to docusate sodium: hold if loose stools dx constipation      Electronically signed by: CATIA Dougherty CNP             [facility]   April 3, 2024     Name: Joyce Figueroa   : 1950       Orders:  - Please fax POLST and health care directive to honoring choices   - Discontinue current tylenol PRN orders  - Add tylenol 1000mg TID at 8AM, 2PM, 7PM dx R hip pain  - Add tylenol 1000mg at bedtime PRN dx pain  - Discontinue hydrocortisone rectal suppository PRN  - Add to docusate sodium: hold if loose stools dx constipation        Electronically signed by   CATIA Dougherty CNP on 4/3/2024 at 4:00 PM             [facility]   2024     Name: Joyce Figueroa   : 1950       Orders:  - XR R hip all views on 24 dx R hip pain        Electronically signed by  Lawanda Harmon MA on 2024 at 3:32 PM        Sincerely,        CATIA Dougherty CNP

## 2024-04-03 NOTE — PROGRESS NOTES
[facility]   April 3, 2024     Name: Joyce Figueroa   : 1950       Orders:  - Please fax POLST and health care directive to honoring choices   - Discontinue current tylenol PRN orders  - Add tylenol 1000mg TID at 8AM, 2PM, 7PM dx R hip pain  - Add tylenol 1000mg at bedtime PRN dx pain  - Discontinue hydrocortisone rectal suppository PRN  - Add to docusate sodium: hold if loose stools dx constipation        Electronically signed by   CATIA Dougherty CNP on 4/3/2024 at 4:00 PM

## 2024-04-05 NOTE — PROGRESS NOTES
[facility] Geriatrics   2024     Name: Joyce Figueroa   : 1950       Orders:  - XR R hip all views on 24 dx R hip pain        Electronically signed by  Lawanda Harmon MA on 2024 at 3:32 PM

## 2024-04-09 ENCOUNTER — DOCUMENTATION ONLY (OUTPATIENT)
Dept: GERIATRICS | Facility: CLINIC | Age: 74
End: 2024-04-09
Payer: COMMERCIAL

## 2024-04-09 DIAGNOSIS — F02.80 ALZHEIMER'S TYPE DEMENTIA WITH LATE ONSET WITHOUT BEHAVIORAL DISTURBANCE (H): ICD-10-CM

## 2024-04-09 DIAGNOSIS — F51.01 PRIMARY INSOMNIA: ICD-10-CM

## 2024-04-09 DIAGNOSIS — G30.1 MODERATE LATE ONSET ALZHEIMER'S DEMENTIA WITH ANXIETY (H): Primary | ICD-10-CM

## 2024-04-09 DIAGNOSIS — F02.B4 MODERATE LATE ONSET ALZHEIMER'S DEMENTIA WITH ANXIETY (H): Primary | ICD-10-CM

## 2024-04-09 DIAGNOSIS — E43 SEVERE PROTEIN-CALORIE MALNUTRITION (H): ICD-10-CM

## 2024-04-09 DIAGNOSIS — D32.9 MENINGIOMA (H): ICD-10-CM

## 2024-04-09 DIAGNOSIS — G30.1 ALZHEIMER'S TYPE DEMENTIA WITH LATE ONSET WITHOUT BEHAVIORAL DISTURBANCE (H): ICD-10-CM

## 2024-04-09 DIAGNOSIS — F41.9 ANXIETY: ICD-10-CM

## 2024-04-09 PROCEDURE — 99358 PROLONG SERVICE W/O CONTACT: CPT | Performed by: NURSE PRACTITIONER

## 2024-04-09 RX ORDER — LORAZEPAM 0.5 MG/1
0.25 TABLET ORAL EVERY 4 HOURS PRN
Qty: 30 TABLET | Refills: 1 | Status: SHIPPED | OUTPATIENT
Start: 2024-04-09 | End: 2024-05-15 | Stop reason: DRUGHIGH

## 2024-04-09 RX ORDER — MIRTAZAPINE 15 MG/1
15 TABLET, FILM COATED ORAL AT BEDTIME
Qty: 30 TABLET | Refills: 11 | Status: SHIPPED | OUTPATIENT
Start: 2024-04-09

## 2024-04-09 RX ORDER — BUSPIRONE HYDROCHLORIDE 5 MG/1
7.5 TABLET ORAL 3 TIMES DAILY
Qty: 60 TABLET | Refills: 11 | Status: SHIPPED | OUTPATIENT
Start: 2024-04-09 | End: 2024-05-09 | Stop reason: DRUGHIGH

## 2024-04-09 RX ORDER — LANOLIN ALCOHOL/MO/W.PET/CERES
3 CREAM (GRAM) TOPICAL AT BEDTIME
Qty: 30 TABLET | Refills: 11 | Status: SHIPPED | OUTPATIENT
Start: 2024-04-09

## 2024-04-09 NOTE — PROGRESS NOTES
[facility] GERIATRICS  NON-FACE TO FACE PROLONGED SERVICE    Joyce Figueroa 1950    Date of Related Face to Face Service: 4/3/24    INTENT OF SERVICE  This is an extended evaluation of patient's specific problem.  The service relates to a recent or future E/M visit.  Documentation supports medical necessity, relates to ongoing patient management and documents start times and stop times.    Regarding the following diagnoses:     Moderate late onset Alzheimer's dementia with anxiety (H)  Severe protein-calorie malnutrition (H24)  Meningioma (H)  Primary insomnia    * I reviewed records for patient's complicated medical history, medication reconciliation, RN notes.  ([id number], [id number]; 17 minutes)    * I coordinated care with RN regarding patient status and changes in behaviors. Reports patient has had > 20lb weight loss since 11/2023.  facility member (136lb in 11/2023, last weight 118lbs in 04/2024). Also note sleeping well at night, has been seen wandering hallways in middle of the night. Not sitting for meals due to constant pacing, needs someone sitting by her side for cues w/meals.  (Start time 1538,  Stop time 1543; 5 minutes)    * I coordinated care with Lilibeth Sánchez PharmD regarding patient status and treatment plan. (Start time 1555,  Stop time 1605; 10 minutes)    * I discussed with Sherin (daughter) regarding patient status, recommendations for changes to treatment plan. Discussed family open to hospice informational meeting. Would like to improve her anxiety, decreasing wandering behaviors. Goals of care are comfort focused. Discussed per chart review, noted tiny meningioma on last MRI 05/2023, unclear if further surveillance was recommended by Neurology; states plans to review information with family. (Start time 1609,  Stop time 1620; 11 minutes)      ASSESSMENT/PLAN       Moderate late onset Alzheimer's dementia with anxiety (H)  Severe protein-calorie malnutrition (H24)  Meningioma  (H)  Primary insomnia    - Increase remeron to 15mg at bedtime dx anxiety w/dementia, insomnia, malnutrition  - Increase buspar to 7.5mg TID dx dementia with anxiety  - Add ativan 0.25mg every 4hrs PRN dx anxiety, agitation  - Add melatonin 3mg at bedtime dx insomnia   - Referral to [facility] for informational meeting dx dementia, weight loss/malnutrition  - Add protein drink (ex ensure) every day (offer protein drink at 10AM and 2PM) dx malnutrition. Ok for family to bring in home supply.       TIME  Total time spent with Non-Face to Face prolonged service 43 minutes.     Electronically signed by:  CATIA Dougherty CNP

## 2024-04-09 NOTE — PROGRESS NOTES
[facility]   2024     Name: Joyce Figueroa   : 1950       Orders:  - Increase remeron to 15mg at bedtime dx anxiety w/dementia, insomnia, malnutrition  - Increase buspar to 7.5mg TID dx dementia with anxiety  - Add ativan 0.25mg every 4hrs PRN dx anxiety, agitation  - Add melatonin 3mg at bedtime dx insomnia   - Referral to [facility] for informational meeting dx dementia, weight loss/malnutrition  - Add protein drink (ex ensure) every day (offer protein drink at 10AM and 2PM) dx malnutrition. Ok for family to bring in home supply.         Electronically signed by   CATIA Dougherty CNP on 2024 at 4:28 PM

## 2024-04-10 ENCOUNTER — TRANSFERRED RECORDS (OUTPATIENT)
Dept: HEALTH INFORMATION MANAGEMENT | Facility: CLINIC | Age: 74
End: 2024-04-10

## 2024-04-10 ENCOUNTER — ASSISTED LIVING VISIT (OUTPATIENT)
Dept: GERIATRICS | Facility: CLINIC | Age: 74
End: 2024-04-10
Payer: COMMERCIAL

## 2024-04-10 VITALS
OXYGEN SATURATION: 97 % | SYSTOLIC BLOOD PRESSURE: 124 MMHG | BODY MASS INDEX: 18.96 KG/M2 | WEIGHT: 118 LBS | HEART RATE: 70 BPM | TEMPERATURE: 97.4 F | DIASTOLIC BLOOD PRESSURE: 87 MMHG | RESPIRATION RATE: 18 BRPM | HEIGHT: 66 IN

## 2024-04-10 DIAGNOSIS — F02.B4 MODERATE LATE ONSET ALZHEIMER'S DEMENTIA WITH ANXIETY (H): ICD-10-CM

## 2024-04-10 DIAGNOSIS — E43 SEVERE PROTEIN-CALORIE MALNUTRITION (H): ICD-10-CM

## 2024-04-10 DIAGNOSIS — D32.9 MENINGIOMA (H): ICD-10-CM

## 2024-04-10 DIAGNOSIS — F51.01 PRIMARY INSOMNIA: ICD-10-CM

## 2024-04-10 DIAGNOSIS — G30.1 MODERATE LATE ONSET ALZHEIMER'S DEMENTIA WITH ANXIETY (H): ICD-10-CM

## 2024-04-10 DIAGNOSIS — M16.11 ARTHRITIS OF RIGHT HIP: Primary | ICD-10-CM

## 2024-04-10 PROCEDURE — 99349 HOME/RES VST EST MOD MDM 40: CPT | Performed by: NURSE PRACTITIONER

## 2024-04-10 NOTE — LETTER
"    4/10/2024        RE: Joyce Figueroa  C/o Randy Figueroa  [address]  [place] [address]        [facility] GERIATRICS    Chief Complaint   Patient presents with    RECHECK     HPI:  Joyce Figueroa is a 73 year old  (1950), who is being seen today for an episodic care visit at: [facility])(FGS) [[id number]].       Today's concern is:     During exam, patient seen in memory care unit. HPI limited due to dementia. Reports doing well, denies pain or concerns today. Ambulates without assistive device. Staff report decreased limping this week. Continues to have difficult time sitting down for meals, ongoing weight loss. Constantly wandering reported by staff.       Allergies, and PMH/PSH reviewed in [facility] today.    REVIEW OF SYSTEMS:  Limited secondary to cognitive impairment but today pt reports no concerns      Objective:   /87   Pulse 70   Temp 97.4  F (36.3  C)   Resp 18   Ht 1.676 m (5' 6\")   Wt 53.5 kg (118 lb)   SpO2 97%   BMI 19.05 kg/m    GENERAL APPEARANCE:  Alert, in no distress, thin, cooperative  RESP:  respiratory effort and palpation of chest normal, lungs clear to auscultation , no respiratory distress  CV:  regular rate and rhythm, no murmur, rub, or gallop, no edema  ABDOMEN:  normal bowel sounds, soft, nontender, no guarding or rebound  M/S:   Gait and station abnormal, unsteady gait. Limping to the right.  SKIN:  Inspection of skin and subcutaneous tissue baseline, Palpation of skin and subcutaneous tissue baseline  NEURO:   Cranial nerves 2-12 are normal tested and grossly at patient's baseline, Examination of sensation by touch normal  PSYCH:  oriented to self, memory impaired       Recent labs in [facility] reviewed by me today.   Most Recent 3 CBC's:  Recent Labs   Lab Test 02/14/24  1120   WBC 6.7   HGB 12.0   MCV 94        Most Recent 3 BMP's:  Recent Labs   Lab Test 02/14/24  1120      POTASSIUM 4.5   CHLORIDE 105   CO2 27   BUN 14.2   CR " 0.78   ANIONGAP 11   LARRY 9.6   *           Assessment/Plan:    (M16.11) Arthritis of right hip  (primary encounter diagnosis)  Comment: R hip arthritis; decreased limping this week  Plan:   - Continue tylenol TID  - UPDATE: R hip XR negative for acute findings, + arthritis, fibroid (3cm) noted in pelvis.  Reviewed patient status, XR results and treatment plan with Sherin (daughter)     (G30.1,  F02.B4) Moderate late onset Alzheimer's dementia with anxiety (H)  (F51.01) Primary insomnia  Comment: Chronic Alzheimer's dementia with anxiety associated behaviors/wandering, insomnia, hx depression.   Plan:   - Continue remeron, buspar, ativan PRN, melatonin, sertraline  - Monitor changes in mood or behaviors  - Continue supportive services at [facility] memory care unit  - Referral to [facility] placed for informational meeting    (E43) Severe protein-calorie malnutrition (H24)  Comment: Ongoing malnutrition, Body mass index is 19.05 kg/m .  Plan:   - Encouraged family to trial protein drink (ex ensure) every day (offer protein drink at 10AM and 2PM) dx malnutrition. Ok for family to bring in home supply.   - Encourage increased cues/supervision w/meals  - Monitor weights during routine visits    (D32.9) Meningioma (H)  Comment: Noted tiny meningioma on last MRI 05/2023   Plan:   - Patient to follow-up with Neurology as directed         Electronically signed by: CATIA Dougherty CNP           Sincerely,        CATIA Dougherty CNP

## 2024-04-10 NOTE — PROGRESS NOTES
"[facility] GERIATRICS    Chief Complaint   Patient presents with    RECHECK     HPI:  Joyce Figueroa is a 73 year old  (1950), who is being seen today for an episodic care visit at: [facility])(FGS) [[id number]].       Today's concern is:     During exam, patient seen in memory care unit. HPI limited due to dementia. Reports doing well, denies pain or concerns today. Ambulates without assistive device. Staff report decreased limping this week. Continues to have difficult time sitting down for meals, ongoing weight loss. Constantly wandering reported by staff.       Allergies, and PMH/PSH reviewed in [facility] today.    REVIEW OF SYSTEMS:  Limited secondary to cognitive impairment but today pt reports no concerns      Objective:   /87   Pulse 70   Temp 97.4  F (36.3  C)   Resp 18   Ht 1.676 m (5' 6\")   Wt 53.5 kg (118 lb)   SpO2 97%   BMI 19.05 kg/m    GENERAL APPEARANCE:  Alert, in no distress, thin, cooperative  RESP:  respiratory effort and palpation of chest normal, lungs clear to auscultation , no respiratory distress  CV:  regular rate and rhythm, no murmur, rub, or gallop, no edema  ABDOMEN:  normal bowel sounds, soft, nontender, no guarding or rebound  M/S:   Gait and station abnormal, unsteady gait. Limping to the right.  SKIN:  Inspection of skin and subcutaneous tissue baseline, Palpation of skin and subcutaneous tissue baseline  NEURO:   Cranial nerves 2-12 are normal tested and grossly at patient's baseline, Examination of sensation by touch normal  PSYCH:  oriented to self, memory impaired       Recent labs in [facility] reviewed by me today.   Most Recent 3 CBC's:  Recent Labs   Lab Test 02/14/24  1120   WBC 6.7   HGB 12.0   MCV 94        Most Recent 3 BMP's:  Recent Labs   Lab Test 02/14/24  1120      POTASSIUM 4.5   CHLORIDE 105   CO2 27   BUN 14.2   CR 0.78   ANIONGAP 11   LARRY 9.6   *           Assessment/Plan:    (M16.11) Arthritis of right hip  (primary " encounter diagnosis)  Comment: R hip arthritis; decreased limping this week  Plan:   - Continue tylenol TID  - UPDATE: R hip XR negative for acute findings, + arthritis, fibroid (3cm) noted in pelvis.  Reviewed patient status, XR results and treatment plan with Sherin (daughter)     (G30.1,  F02.B4) Moderate late onset Alzheimer's dementia with anxiety (H)  (F51.01) Primary insomnia  Comment: Chronic Alzheimer's dementia with anxiety associated behaviors/wandering, insomnia, hx depression.   Plan:   - Continue remeron, buspar, ativan PRN, melatonin, sertraline  - Monitor changes in mood or behaviors  - Continue supportive services at [facility] memory care unit  - Referral to [facility] placed for informational meeting    (E43) Severe protein-calorie malnutrition (H24)  Comment: Ongoing malnutrition, Body mass index is 19.05 kg/m .  Plan:   - Encouraged family to trial protein drink (ex ensure) every day (offer protein drink at 10AM and 2PM) dx malnutrition. Ok for family to bring in home supply.   - Encourage increased cues/supervision w/meals  - Monitor weights during routine visits    (D32.9) Meningioma (H)  Comment: Noted tiny meningioma on last MRI 05/2023   Plan:   - Patient to follow-up with Neurology as directed         Electronically signed by: CATIA Dougherty CNP

## 2024-04-11 ENCOUNTER — TELEPHONE (OUTPATIENT)
Dept: GERIATRICS | Facility: CLINIC | Age: 74
End: 2024-04-11
Payer: COMMERCIAL

## 2024-04-11 NOTE — TELEPHONE ENCOUNTER
[facility] Lab Note     Provider: CATIA Hilton CNP   Facility: [facility]  Facility Type:  AL    Allergies   Allergen Reactions    Escitalopram Nausea       Labs Reviewed by provider: Right hip XR         Verbal Order/Direction given by Provider: DELVISO    Provider giving Order:  CATIA Hilton CNP     Verbal Order given to: Mirtha Lawson RN

## 2024-04-23 VITALS — BODY MASS INDEX: 18.96 KG/M2 | HEIGHT: 66 IN | WEIGHT: 118 LBS

## 2024-04-23 NOTE — PROGRESS NOTES
"[facility] GERIATRICS    Chief Complaint   Patient presents with    RECHECK     HPI:  Joyce Figueroa is a 73 year old  (1950), who is being seen today for an episodic care visit at: [facility])(FGS) [[id number]].       Today's concern is:     Patient seen today to follow-up on wandering/anxiety behaviors with recent medication changes approx 2 weeks ago.     Staff report patient is sleeping better at night. Continues to wander throughout the day and having difficulty sitting in one place for a long period of time, including meals.   During exam, patient seen in memory care unit. HPI limited due to dementia. Reports doing well, denies pain or concerns. Sleeping well at night. Denies constipation, diarrhea. Denies chest pain, SOB, headache, syncope.      Allergies, and PMH/PSH reviewed in [facility] today.    REVIEW OF SYSTEMS:  Limited secondary to cognitive impairment but today pt reports no concerns      Objective:   Ht 1.676 m (5' 6\")   Wt 53.5 kg (118 lb)   BMI 19.05 kg/m    GENERAL APPEARANCE:  Alert, in no distress, thin, cooperative  RESP:  respiratory effort and palpation of chest normal, lungs clear to auscultation , no respiratory distress  CV:  regular rate and rhythm, no murmur, rub, or gallop, no edema  ABDOMEN:  normal bowel sounds, soft, nontender, no guarding or rebound  M/S:   Gait and station abnormal, unsteady gait.  SKIN:  Inspection of skin and subcutaneous tissue baseline, Palpation of skin and subcutaneous tissue baseline  NEURO:   Cranial nerves 2-12 are normal tested and grossly at patient's baseline, Examination of sensation by touch normal  PSYCH:  oriented to self, memory impaired       Recent labs in [facility] reviewed by me today.  Most Recent 3 CBC's:  Recent Labs   Lab Test 02/14/24  1120   WBC 6.7   HGB 12.0   MCV 94        Most Recent 3 BMP's:  Recent Labs   Lab Test 02/14/24  1120      POTASSIUM 4.5   CHLORIDE 105   CO2 27   BUN 14.2   CR 0.78   ANIONGAP 11 "   LARRY 9.6   *     Most Recent 2 LFT's:  Recent Labs   Lab Test 02/14/24  1120   AST 27   ALT 15   ALKPHOS 59   BILITOTAL 0.4       Assessment/Plan:    (G30.1,  F02.B4) Moderate late onset Alzheimer's dementia with anxiety (H)  (primary encounter diagnosis)  (F51.01) Primary insomnia  Comment: Chronic Alzheimer's dementia with anxiety associated behaviors/ongoing wandering, insomnia, hx depression. Staff report wandering affecting ability to sit down for meals.  Plan:   - Continue remeron, buspar, ativan PRN, melatonin, sertraline  - Monitor changes in mood or behaviors  - Continue supportive services at [facility] memory care unit  - Consider increasing buspar to 10mg TID   - Plan to consult with Juli Shirley NP for polypharmacy review.   - Hospice informational meeting scheduled for 5/6/24    (I10) Primary hypertension  Comment: BP controlled  Plan:   - Recheck vital signs and weight; update provider with results  - Check orthostatic vital signs; update provider with results   - Continue amlodipine, lipitor, ASA      Orders:  - Recheck vital signs and weight; update provider with results  - Check orthostatic vital signs; update provider with results         Electronically signed by: CATIA Dougherty CNP

## 2024-04-24 ENCOUNTER — ASSISTED LIVING VISIT (OUTPATIENT)
Dept: GERIATRICS | Facility: CLINIC | Age: 74
End: 2024-04-24
Payer: COMMERCIAL

## 2024-04-24 DIAGNOSIS — F02.B4 MODERATE LATE ONSET ALZHEIMER'S DEMENTIA WITH ANXIETY (H): Primary | ICD-10-CM

## 2024-04-24 DIAGNOSIS — I10 PRIMARY HYPERTENSION: ICD-10-CM

## 2024-04-24 DIAGNOSIS — G30.1 MODERATE LATE ONSET ALZHEIMER'S DEMENTIA WITH ANXIETY (H): Primary | ICD-10-CM

## 2024-04-24 DIAGNOSIS — F51.01 PRIMARY INSOMNIA: ICD-10-CM

## 2024-04-24 PROCEDURE — 99349 HOME/RES VST EST MOD MDM 40: CPT | Performed by: NURSE PRACTITIONER

## 2024-04-24 NOTE — LETTER
"    4/24/2024        RE: Joyce Figueroa  C/o Randy Figueroa  [address] [address]        [facility] GERIATRICS    Chief Complaint   Patient presents with     RECHECK     HPI:  Joyce Figueroa is a 73 year old  (1950), who is being seen today for an episodic care visit at: [facility])(FGS) [[id number]].       Today's concern is:     Patient seen today to follow-up on wandering/anxiety behaviors with recent medication changes approx 2 weeks ago.     Staff report patient is sleeping better at night. Continues to wander throughout the day and having difficulty sitting in one place for a long period of time, including meals.   During exam, patient seen in memory care unit. HPI limited due to dementia. Reports doing well, denies pain or concerns. Sleeping well at night. Denies constipation, diarrhea. Denies chest pain, SOB, headache, syncope.      Allergies, and PMH/PSH reviewed in [facility] today.    REVIEW OF SYSTEMS:  Limited secondary to cognitive impairment but today pt reports no concerns      Objective:   Ht 1.676 m (5' 6\")   Wt 53.5 kg (118 lb)   BMI 19.05 kg/m    GENERAL APPEARANCE:  Alert, in no distress, thin, cooperative  RESP:  respiratory effort and palpation of chest normal, lungs clear to auscultation , no respiratory distress  CV:  regular rate and rhythm, no murmur, rub, or gallop, no edema  ABDOMEN:  normal bowel sounds, soft, nontender, no guarding or rebound  M/S:   Gait and station abnormal, unsteady gait.  SKIN:  Inspection of skin and subcutaneous tissue baseline, Palpation of skin and subcutaneous tissue baseline  NEURO:   Cranial nerves 2-12 are normal tested and grossly at patient's baseline, Examination of sensation by touch normal  PSYCH:  oriented to self, memory impaired       Recent labs in [facility] reviewed by me today.  Most Recent 3 CBC's:  Recent Labs   Lab Test 02/14/24  1120   WBC 6.7   HGB 12.0   MCV 94        Most Recent 3 BMP's:  Recent " Labs   Lab Test 24  1120      POTASSIUM 4.5   CHLORIDE 105   CO2 27   BUN 14.2   CR 0.78   ANIONGAP 11   LARRY 9.6   *     Most Recent 2 LFT's:  Recent Labs   Lab Test 24  1120   AST 27   ALT 15   ALKPHOS 59   BILITOTAL 0.4       Assessment/Plan:    (G30.1,  F02.B4) Moderate late onset Alzheimer's dementia with anxiety (H)  (primary encounter diagnosis)  (F51.01) Primary insomnia  Comment: Chronic Alzheimer's dementia with anxiety associated behaviors/ongoing wandering, insomnia, hx depression. Staff report wandering affecting ability to sit down for meals.  Plan:   - Continue remeron, buspar, ativan PRN, melatonin, sertraline  - Monitor changes in mood or behaviors  - Continue supportive services at [facility] memory care unit  - Consider increasing buspar to 10mg TID   - Plan to consult with Juli Shirley NP for polypharmacy review.   - Hospice informational meeting scheduled for 24    (I10) Primary hypertension  Comment: BP controlled  Plan:   - Recheck vital signs and weight; update provider with results  - Check orthostatic vital signs; update provider with results   - Continue amlodipine, lipitor, ASA      Orders:  - Recheck vital signs and weight; update provider with results  - Check orthostatic vital signs; update provider with results         Electronically signed by: CATIA Dougherty CNP              [facility]   2024     Name: Joyce COLTON Figueroa   : 1950       Orders:  - Recheck vital signs and weight; update provider with results  - Check orthostatic vital signs; update provider with results         Electronically signed by   CATIA Dougherty CNP on 2024 at 10:35 PM           Sincerely,        CATIA Dougherty CNP

## 2024-04-26 NOTE — PROGRESS NOTES
[facility]   2024     Name: Joyce Figueroa   : 1950       Orders:  - Recheck vital signs and weight; update provider with results  - Check orthostatic vital signs; update provider with results         Electronically signed by   CATIA Dougherty CNP on 2024 at 10:35 PM

## 2024-04-29 ENCOUNTER — TELEPHONE (OUTPATIENT)
Dept: GERIATRICS | Facility: CLINIC | Age: 74
End: 2024-04-29
Payer: COMMERCIAL

## 2024-04-29 RX ORDER — SENNOSIDES 8.6 MG
1 TABLET ORAL DAILY
COMMUNITY
End: 2024-05-15 | Stop reason: DRUGHIGH

## 2024-04-29 NOTE — TELEPHONE ENCOUNTER
[facility] NON-EMERGENT ENCOUNTER    Joyce Figueroa is a 73 year old  (1950)    Disposition  Pt has intermittent constipation.    Requests  Senna 1 tab PO BID; hold for loose stools.      Electronically signed by:   Gena Hammond RN

## 2024-05-01 ENCOUNTER — ASSISTED LIVING VISIT (OUTPATIENT)
Dept: GERIATRICS | Facility: CLINIC | Age: 74
End: 2024-05-01
Payer: COMMERCIAL

## 2024-05-01 ENCOUNTER — DOCUMENTATION ONLY (OUTPATIENT)
Dept: GERIATRICS | Facility: CLINIC | Age: 74
End: 2024-05-01

## 2024-05-01 VITALS
BODY MASS INDEX: 18.99 KG/M2 | WEIGHT: 118.2 LBS | DIASTOLIC BLOOD PRESSURE: 69 MMHG | HEIGHT: 66 IN | HEART RATE: 74 BPM | SYSTOLIC BLOOD PRESSURE: 109 MMHG | RESPIRATION RATE: 16 BRPM | TEMPERATURE: 97.7 F

## 2024-05-01 DIAGNOSIS — F02.B4 MODERATE LATE ONSET ALZHEIMER'S DEMENTIA WITH ANXIETY (H): ICD-10-CM

## 2024-05-01 DIAGNOSIS — K52.9 FREQUENT STOOLS: ICD-10-CM
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
## 2024-05-01 DIAGNOSIS — G30.1 MODERATE LATE ONSET ALZHEIMER'S DEMENTIA WITH ANXIETY (H): ICD-10-CM

## 2024-05-01 PROCEDURE — 99349 HOME/RES VST EST MOD MDM 40: CPT | Performed by: NURSE PRACTITIONER

## 2024-05-01 NOTE — LETTER
"    5/1/2024        RE: Joyce Figueroa  C/o Randy Figueroa  [address]  [place] [address]        [facility]    Chief Complaint   Patient presents with     RECHECK     HPI:  Joyce Figueroa is a 73 year old  (1950), who is being seen today for an episodic care visit at: [facility])(FGS) [[id number]].       Today's concern is:     Patient seen today due to concern regarding frequent loose stools. RN staff report on 4/29/24, patient had constipation and received orders to start senna PRN (received from pharmacy yesterday afternoon, not given). Developed loose stools on 4/30/24, spouse declined scheduled docusate sodium on 4/30 and 5/1/24 due to ongoing diarrhea. Patient had fall on 5/1/24 at 0023 next to recliner, VSS and no signs of injuries. Today, patient having frequent loose stools, abdominal discomfort, unable to control bowels. Staff also report decreased intake w/meals x 2 days.     During exam, patient seen in bathroom w/RA present. HPI limited due to dementia. Unable to describe how she's feeling today; visibly appears to be uncomfortable. Denies pain, nausea, vomiting. Denies recent falls.      Allergies, and PMH/PSH reviewed in [facility] today.    REVIEW OF SYSTEMS:  Limited secondary to cognitive impairment but today pt reports no concerns      Objective:   /69   Pulse 74   Temp 97.7  F (36.5  C)   Resp 16   Ht 1.676 m (5' 6\")   Wt 53.6 kg (118 lb 3.2 oz)   BMI 19.08 kg/m    GENERAL APPEARANCE:  Alert, thin, cooperative; appears uncomfortable  RESP:  no respiratory distress  CV:  no edema  ABDOMEN:  normal bowel sounds, soft, nontender; liquid stool noted in brief  M/S:   Gait and station abnormal, unsteady gait.   SKIN:  Inspection of skin and subcutaneous tissue baseline, Palpation of skin and subcutaneous tissue baseline  NEURO:   Cranial nerves 2-12 are normal tested and grossly at patient's baseline, Examination of sensation by touch normal  PSYCH: "  oriented to self, memory impaired       Recent labs in [facility] reviewed by me today.       Assessment/Plan:    (W19.XXXA) Fall, initial encounter  (primary encounter diagnosis)  (K52.9) Frequent stools  (G30.1,  F02.B4) Moderate late onset Alzheimer's dementia with anxiety (H)  Comment: Fall on 5/1/24 r/t frequent stools. Increased frequency of stools r/t diarrhea vs fecal impaction vs infectious process. Afebrile, VSS. Decreased intake w/meals, increased weakness.   Plan:   - Per chart review, staff held docusate sodium 4/30-5/1/24  - Unable to obtain abdominal XR until later today or tomorrow  - Left voicemail for Randy (spouse) to review patient status  - Reviewed patient status and treatment options with Sherin (daughter), discussed recommendation to be evaluated in ED due to increased discomfort associated with frequent stools and high fall risk. Agrees with plan and will update Randy regarding recommendation; plan to be evaluated at [place] [address] ED.         Electronically signed by: CATIA Dougherty CNP           Sincerely,        CATIA Dougherty CNP

## 2024-05-01 NOTE — PROGRESS NOTES
"[facility] GERIATRICS    Chief Complaint   Patient presents with    RECHECK     HPI:  Joyce Figueroa is a 73 year old  (1950), who is being seen today for an episodic care visit at: [facility])(FGS) [[id number]].       Today's concern is:     Patient seen today due to concern regarding frequent loose stools. RN staff report on 4/29/24, patient had constipation and received orders to start senna PRN (received from pharmacy yesterday afternoon, not given). Developed loose stools on 4/30/24, spouse declined scheduled docusate sodium on 4/30 and 5/1/24 due to ongoing diarrhea. Patient had fall on 5/1/24 at 0023 next to recliner, VSS and no signs of injuries. Today, patient having frequent loose stools, abdominal discomfort, unable to control bowels. Staff also report decreased intake w/meals x 2 days.     During exam, patient seen in bathroom w/RA present. HPI limited due to dementia. Unable to describe how she's feeling today; visibly appears to be uncomfortable. Denies pain, nausea, vomiting. Denies recent falls.      Allergies, and PMH/PSH reviewed in [facility] today.    REVIEW OF SYSTEMS:  Limited secondary to cognitive impairment but today pt reports no concerns      Objective:   /69   Pulse 74   Temp 97.7  F (36.5  C)   Resp 16   Ht 1.676 m (5' 6\")   Wt 53.6 kg (118 lb 3.2 oz)   BMI 19.08 kg/m    GENERAL APPEARANCE:  Alert, thin, cooperative; appears uncomfortable  RESP:  no respiratory distress  CV:  no edema  ABDOMEN:  normal bowel sounds, soft, nontender; liquid stool noted in brief  M/S:   Gait and station abnormal, unsteady gait.   SKIN:  Inspection of skin and subcutaneous tissue baseline, Palpation of skin and subcutaneous tissue baseline  NEURO:   Cranial nerves 2-12 are normal tested and grossly at patient's baseline, Examination of sensation by touch normal  PSYCH:  oriented to self, memory impaired       Recent labs in [facility] reviewed by me today. "       Assessment/Plan:    (W19.XXXA) Fall, initial encounter  (primary encounter diagnosis)  (K52.9) Frequent stools  (G30.1,  F02.B4) Moderate late onset Alzheimer's dementia with anxiety (H)  Comment: Fall on 5/1/24 r/t frequent stools. Increased frequency of stools r/t diarrhea vs fecal impaction vs infectious process. Afebrile, VSS. Decreased intake w/meals, increased weakness.   Plan:   - Per chart review, staff held docusate sodium 4/30-5/1/24  - Unable to obtain abdominal XR until later today or tomorrow  - Left voicemail for Randy (spouse) to review patient status  - Reviewed patient status and treatment options with Sherin (daughter), discussed recommendation to be evaluated in ED due to increased discomfort associated with frequent stools and high fall risk. Agrees with plan and will update Randy regarding recommendation; plan to be evaluated at [place] [address] ED.         Electronically signed by: CATIA Dougherty CNP

## 2024-05-01 NOTE — PROGRESS NOTES
Psychiatric Chart Review    Chart review completed today on Joyce Figueroa residing at ***. Psychiatric Nurse Practitioner reviewed patient's current medication list and facility progress notes. No recent calls or noted concerns voiced to provider regarding patient's psychiatric diagnoses or medications. ***.    Data Unavailable    ***    Will continue current regimen of:  ***  ***  ***    With planned follow-up ***, or beforehand PRN.     Appointment performed in person with the provider at ***. The time spent was used as follows: 1) Preparing to see the patient. 2) Obtaining and/or reviewing separately obtained history. 3) Performing a medically or psychologically appropriate examination and/or evaluation. 4) Counseling and educating the patient/family caregiver. 5) Ordering medications and/or tests and/or procedures. 6) Referring and communicating with other health care professionals. 7) Documenting clinical information in electronic health records. 8) Independently interpreting results and communicating results to the patient/family/caregiver/Psychotherapy***. 9) Care coordination. The total time of the appointment was *** minutes.    Dr. Ledy Strange, APRN, DNP, AGNP-BC, PMHNP-BC  Office Hours: Tues-Fri [id number]  [facility]  Office: [address] [place] [address]  Fax - [phone]  Office/Triage Phone - [phone]  Email: [email or web address]

## 2024-05-08 ENCOUNTER — DOCUMENTATION ONLY (OUTPATIENT)
Dept: OTHER | Facility: CLINIC | Age: 74
End: 2024-05-08

## 2024-05-08 ENCOUNTER — ASSISTED LIVING VISIT (OUTPATIENT)
Dept: GERIATRICS | Facility: CLINIC | Age: 74
End: 2024-05-08
Payer: COMMERCIAL

## 2024-05-08 VITALS
OXYGEN SATURATION: 95 % | SYSTOLIC BLOOD PRESSURE: 106 MMHG | HEART RATE: 78 BPM | RESPIRATION RATE: 14 BRPM | BODY MASS INDEX: 18.29 KG/M2 | HEIGHT: 66 IN | DIASTOLIC BLOOD PRESSURE: 62 MMHG | TEMPERATURE: 98.8 F | WEIGHT: 113.8 LBS

## 2024-05-08 DIAGNOSIS — F03.C4 SEVERE DEMENTIA WITH ANXIETY, UNSPECIFIED DEMENTIA TYPE (H): Primary | ICD-10-CM

## 2024-05-08 DIAGNOSIS — E43 SEVERE PROTEIN-CALORIE MALNUTRITION (H): ICD-10-CM

## 2024-05-08 DIAGNOSIS — K59.01 SLOW TRANSIT CONSTIPATION: ICD-10-CM

## 2024-05-08 DIAGNOSIS — I10 BENIGN ESSENTIAL HYPERTENSION: ICD-10-CM

## 2024-05-08 PROCEDURE — 99349 HOME/RES VST EST MOD MDM 40: CPT | Performed by: NURSE PRACTITIONER

## 2024-05-08 NOTE — PROGRESS NOTES
"[facility] GERIATRICS    Chief Complaint   Patient presents with    ED follow-up     HPI:  Joyce Figueroa is a 73 year old  (1950), who is being seen today for an episodic care visit at: [facility])(FGS) [[id number]].       Patient evaluated at [place] [address] ED on 5/1/24 due to fall, increased loose stools and abdominal pain. WBC 14.7, Hgb 10.4 (last Hgb 12.4 several months ago), lactate normal. CT abd + fecal impaction. Completed digital fecal disimpaction.      Today's concern is:     RN staff reports ongoing wandering, difficult for patient to sit in one location for longer than approx 5 minutes. States patient is unable to sit for a meal due to constant wandering.     HPI limited due to dementia, answers questions with 1-2 word responses. Reports doing well, denies pain or concerns. Unable to recall what she ate for breakfast. Sleeping well at night. Denies constipation, diarrhea. Denies chest pain, SOB, headache, syncope.       Allergies, and PMH/PSH reviewed in [facility] today.    REVIEW OF SYSTEMS:  Limited secondary to cognitive impairment but today pt reports no concerns       Objective:   /62   Pulse 78   Temp 98.8  F (37.1  C)   Resp 14   Ht 1.676 m (5' 6\")   Wt 51.6 kg (113 lb 12.8 oz)   SpO2 95%   BMI 18.37 kg/m    GENERAL APPEARANCE:  Alert, in no distress, thin, cooperative  RESP:  respiratory effort and palpation of chest normal, lungs clear to auscultation , no respiratory distress  CV:  regular rate and rhythm, no murmur, rub, or gallop, no edema  ABDOMEN:  normal bowel sounds, soft, nontender, no guarding or rebound  M/S:   Gait and station abnormal, unsteady gait.  SKIN:  Inspection of skin and subcutaneous tissue baseline, Palpation of skin and subcutaneous tissue baseline  NEURO:   Cranial nerves 2-12 are normal tested and grossly at patient's baseline, Examination of sensation by touch normal  PSYCH:  oriented to self, memory impaired       Patient is on " hospice/palliative care and labs are not recommended      Assessment/Plan:    (F03.C4) Severe dementia with anxiety, unspecified dementia type (H)  (primary encounter diagnosis)  Comment:  Chronic Alzheimer's dementia with anxiety associated behaviors/ongoing wandering, insomnia, hx depression. Staff report wandering affecting ability to sit down for meals.   Admitted to [place] hospice services on 5/6/24.   Plan:   - Increase buspar to 10mg TID dx dementia with anxiety  - Continue remeron, ativan PRN, melatonin, sertraline  - Continue comfort meds PRN  - Monitor changes in mood or behaviors  - Continue supportive services at [facility] memory care unit  - [place] hospice team following, goals of care are comfort focused  - Reviewed patient status and treatment plan with Randy (spouse)     (E43) Severe protein-calorie malnutrition (H24)  Comment: Ongoing; requires increased cues for meals  Plan:   - Offer assistance w/meals  - Continue remeron    (I10) Benign essential hypertension  Comment: Controlled with intermittent hypotension. High fall risk.  Plan:   - Discontinue amlodipine    (K59.01) Slow transit constipation  Comment: Improving; recent fecal impaction w/ED visit on 5/1/24  Plan:   - Continue senna      Orders:  - Discontinue amlodipine  - Increase buspar to 10mg TID dx dementia with anxiety      Electronically signed by: CATIA Dougherty CNP

## 2024-05-08 NOTE — LETTER
"    5/8/2024        RE: Joyce Figueroa  C/o Randy Figueroa  [address]  [place] [address]        [facility] GERIATRICS    Chief Complaint   Patient presents with    ED follow-up     HPI:  Joyce Figueroa is a 73 year old  (1950), who is being seen today for an episodic care visit at: [facility])(FGS) [[id number]].       Patient evaluated at [place] [address] ED on 5/1/24 due to fall, increased loose stools and abdominal pain. WBC 14.7, Hgb 10.4 (last Hgb 12.4 several months ago), lactate normal. CT abd + fecal impaction. Completed digital fecal disimpaction.      Today's concern is:     RN staff reports ongoing wandering, difficult for patient to sit in one location for longer than approx 5 minutes. States patient is unable to sit for a meal due to constant wandering.     HPI limited due to dementia, answers questions with 1-2 word responses. Reports doing well, denies pain or concerns. Unable to recall what she ate for breakfast. Sleeping well at night. Denies constipation, diarrhea. Denies chest pain, SOB, headache, syncope.       Allergies, and PMH/PSH reviewed in [facility] today.    REVIEW OF SYSTEMS:  Limited secondary to cognitive impairment but today pt reports no concerns       Objective:   /62   Pulse 78   Temp 98.8  F (37.1  C)   Resp 14   Ht 1.676 m (5' 6\")   Wt 51.6 kg (113 lb 12.8 oz)   SpO2 95%   BMI 18.37 kg/m    GENERAL APPEARANCE:  Alert, in no distress, thin, cooperative  RESP:  respiratory effort and palpation of chest normal, lungs clear to auscultation , no respiratory distress  CV:  regular rate and rhythm, no murmur, rub, or gallop, no edema  ABDOMEN:  normal bowel sounds, soft, nontender, no guarding or rebound  M/S:   Gait and station abnormal, unsteady gait.  SKIN:  Inspection of skin and subcutaneous tissue baseline, Palpation of skin and subcutaneous tissue baseline  NEURO:   Cranial nerves 2-12 are normal tested and grossly at patient's baseline, " Examination of sensation by touch normal  PSYCH:  oriented to self, memory impaired       Patient is on hospice/palliative care and labs are not recommended      Assessment/Plan:    (F03.C4) Severe dementia with anxiety, unspecified dementia type (H)  (primary encounter diagnosis)  Comment:  Chronic Alzheimer's dementia with anxiety associated behaviors/ongoing wandering, insomnia, hx depression. Staff report wandering affecting ability to sit down for meals.   Admitted to [place] hospice services on 24.   Plan:   - Increase buspar to 10mg TID dx dementia with anxiety  - Continue remeron, ativan PRN, melatonin, sertraline  - Continue comfort meds PRN  - Monitor changes in mood or behaviors  - Continue supportive services at [facility] memory care unit  - [place] hospice team following, goals of care are comfort focused  - Reviewed patient status and treatment plan with Randy (spouse)     (E43) Severe protein-calorie malnutrition (H24)  Comment: Ongoing; requires increased cues for meals  Plan:   - Offer assistance w/meals  - Continue remeron    (I10) Benign essential hypertension  Comment: Controlled with intermittent hypotension. High fall risk.  Plan:   - Discontinue amlodipine    (K59.01) Slow transit constipation  Comment: Improving; recent fecal impaction w/ED visit on 24  Plan:   - Continue senna      Orders:  - Discontinue amlodipine  - Increase buspar to 10mg TID dx dementia with anxiety      Electronically signed by: CATIA Dougherty CNP              [facility]   May 9, 2024     Name: Joyce Figueroa   : 1950       Orders:  - Discontinue amlodipine  - Increase buspar to 10mg TID dx dementia with anxiety      Electronically signed by   CATIA Dougherty CNP on 2024 at 12:49 PM          Sincerely,        CATIA Dougherty CNP

## 2024-05-09 RX ORDER — BUSPIRONE HYDROCHLORIDE 10 MG/1
10 TABLET ORAL 3 TIMES DAILY
Qty: 90 TABLET | Refills: 11 | Status: SHIPPED | OUTPATIENT
Start: 2024-05-09 | End: 2024-07-31 | Stop reason: DRUGHIGH

## 2024-05-09 NOTE — PROGRESS NOTES
[facility]   May 9, 2024     Name: Joyce Figueroa   : 1950       Orders:  - Discontinue amlodipine  - Increase buspar to 10mg TID dx dementia with anxiety      Electronically signed by   CATIA Dougherty CNP on 2024 at 12:49 PM

## 2024-05-14 ENCOUNTER — MEDICAL CORRESPONDENCE (OUTPATIENT)
Dept: HEALTH INFORMATION MANAGEMENT | Facility: CLINIC | Age: 74
End: 2024-05-14
Payer: COMMERCIAL

## 2024-05-15 ENCOUNTER — ASSISTED LIVING VISIT (OUTPATIENT)
Dept: GERIATRICS | Facility: CLINIC | Age: 74
End: 2024-05-15
Payer: COMMERCIAL

## 2024-05-15 VITALS
WEIGHT: 113.8 LBS | OXYGEN SATURATION: 95 % | DIASTOLIC BLOOD PRESSURE: 62 MMHG | BODY MASS INDEX: 18.29 KG/M2 | HEIGHT: 66 IN | TEMPERATURE: 98.8 F | SYSTOLIC BLOOD PRESSURE: 106 MMHG | RESPIRATION RATE: 14 BRPM | HEART RATE: 78 BPM

## 2024-05-15 DIAGNOSIS — K59.01 SLOW TRANSIT CONSTIPATION: ICD-10-CM

## 2024-05-15 DIAGNOSIS — F51.01 PRIMARY INSOMNIA: ICD-10-CM

## 2024-05-15 DIAGNOSIS — F03.C4 SEVERE DEMENTIA WITH ANXIETY, UNSPECIFIED DEMENTIA TYPE (H): Primary | ICD-10-CM

## 2024-05-15 PROCEDURE — 99349 HOME/RES VST EST MOD MDM 40: CPT | Performed by: NURSE PRACTITIONER

## 2024-05-15 RX ORDER — HALOPERIDOL 0.5 MG/1
0.5 TABLET ORAL
COMMUNITY
Start: 2024-05-15

## 2024-05-15 RX ORDER — LORAZEPAM 0.5 MG/1
TABLET ORAL
Qty: 45 TABLET | Refills: 0 | Status: SHIPPED | OUTPATIENT
Start: 2024-05-15 | End: 2024-07-31 | Stop reason: DRUGHIGH

## 2024-05-15 RX ORDER — SENNOSIDES 8.6 MG
TABLET ORAL
Qty: 60 TABLET | Refills: 11 | Status: SHIPPED | OUTPATIENT
Start: 2024-05-15

## 2024-05-15 NOTE — LETTER
"    5/15/2024        RE: Joyce Figueroa  C/o Randy Figuerao  [address] [address]        [facility]    Chief Complaint   Patient presents with     RECHECK     HPI:  Joyce Figueroa is a 73 year old  (1950), who is being seen today for an episodic care visit at: [facility])([facility]) [[id number]].       Today's concern is:     Patient seen today due to RN request. Reports patient having difficult time sleeping at night. States staff administered ativan PRN and seemed to help improve her sleep and wandering behaviors at night; also reports gave ativan PRN w/meal and also seemed to help improve her ability to eat.    HPI limited due to dementia, answers questions with 1-2 word responses. Unable to report if sleeping well at night. Denies pain or discomforts today. Denies constipation, diarrhea.      Allergies, and PMH/PSH reviewed in EPIC today.    REVIEW OF SYSTEMS:  Unobtainable secondary to cognitive impairment.       Objective:   /62   Pulse 78   Temp 98.8  F (37.1  C)   Resp 14   Ht 1.676 m (5' 6\")   Wt 51.6 kg (113 lb 12.8 oz)   SpO2 95%   BMI 18.37 kg/m    GENERAL APPEARANCE:  Alert, in no distress, thin, cooperative  RESP:  respiratory effort and palpation of chest normal, lungs clear to auscultation , no respiratory distress  CV:  regular rate and rhythm, no murmur, rub, or gallop, no edema  ABDOMEN:  normal bowel sounds, soft, nontender, no guarding or rebound  M/S:   Gait and station abnormal, unsteady gait.  SKIN:  Inspection of skin and subcutaneous tissue baseline, Palpation of skin and subcutaneous tissue baseline  NEURO:   Cranial nerves 2-12 are normal tested and grossly at patient's baseline, Examination of sensation by touch normal  PSYCH:  oriented to self, memory impaired       Patient is on hospice/palliative care and labs are not recommended      Assessment/Plan:    (F03.C4) Severe dementia with anxiety, unspecified dementia type (H) "  (primary encounter diagnosis)  (F51.01) Primary insomnia  Comment: Chronic Alzheimer's dementia with anxiety associated behaviors/ongoing wandering, insomnia, hx depression. Staff report wandering affecting ability to sit down for meals. Admitted to [place] hosp[facility] services on 24.   Plan:   - Add schedule ativan 0.5mg at bedtime dx insomnia, anxiety  - Continue ativan 0.5mg every 4hrs PRN dx anxiety, agitation  - Continue buspar TID, remeron, melatonin, sertraline  - Continue comfort meds PRN  - Monitor changes in mood or behaviors  - Continue supportive services at [facility] memory care unit  - [place] hospice team following, goals of care are comfort focused  - Reviewed patient status and treatment plan with Randy (spouse)     (K59.01) Slow transit constipation  Comment: Controlled with intermittent loose stools  Plan:   - Decrease senna to 1 tab MWF; hold if loose stools dx constipation  - Continue senna 1 tab every day PRN dx constipation      Orders:  - Decrease senna to 1 tab MWF; hold if loose stools dx constipation  - Continue senna 1 tab every day PRN dx constipation  - Add schedule ativan 0.5mg at bedtime dx insomnia, anxiety  - Continue ativan 0.5mg every 4hrs PRN dx anxiety, agitation       Electronically signed by: CATIA Dougherty CNP              [facility]   May 15, 2024     Name: Joyce Figueroa   : 1950       Orders:  - Decrease senna to 1 tab MWF; hold if loose stools dx constipation  - Continue senna 1 tab every day PRN dx constipation  - Add schedule ativan 0.5mg at bedtime dx insomnia, anxiety  - Continue ativan 0.5mg every 4hrs PRN dx anxiety, agitation       Electronically signed by   CATIA Dougherty CNP on 5/15/2024 at 3:10 PM           Sincerely,        CATIA Dougherty CNP

## 2024-05-15 NOTE — PROGRESS NOTES
[facility]   May 15, 2024     Name: Joyce Figueroa   : 1950       Orders:  - Decrease senna to 1 tab MWF; hold if loose stools dx constipation  - Continue senna 1 tab every day PRN dx constipation  - Add schedule ativan 0.5mg at bedtime dx insomnia, anxiety  - Continue ativan 0.5mg every 4hrs PRN dx anxiety, agitation       Electronically signed by   CATIA Dougherty CNP on 5/15/2024 at 3:10 PM

## 2024-05-15 NOTE — PROGRESS NOTES
"[facility] GERIATRICS    Chief Complaint   Patient presents with    RECHECK     HPI:  Joyce Figueroa is a 73 year old  (1950), who is being seen today for an episodic care visit at: [facility])([facility]) [[id number]].       Today's concern is:     Patient seen today due to RN request. Reports patient having difficult time sleeping at night. States staff administered ativan PRN and seemed to help improve her sleep and wandering behaviors at night; also reports gave ativan PRN w/meal and also seemed to help improve her ability to eat.    HPI limited due to dementia, answers questions with 1-2 word responses. Unable to report if sleeping well at night. Denies pain or discomforts today. Denies constipation, diarrhea.      Allergies, and PMH/PSH reviewed in [facility] today.    REVIEW OF SYSTEMS:  Unobtainable secondary to cognitive impairment.       Objective:   /62   Pulse 78   Temp 98.8  F (37.1  C)   Resp 14   Ht 1.676 m (5' 6\")   Wt 51.6 kg (113 lb 12.8 oz)   SpO2 95%   BMI 18.37 kg/m    GENERAL APPEARANCE:  Alert, in no distress, thin, cooperative  RESP:  respiratory effort and palpation of chest normal, lungs clear to auscultation , no respiratory distress  CV:  regular rate and rhythm, no murmur, rub, or gallop, no edema  ABDOMEN:  normal bowel sounds, soft, nontender, no guarding or rebound  M/S:   Gait and station abnormal, unsteady gait.  SKIN:  Inspection of skin and subcutaneous tissue baseline, Palpation of skin and subcutaneous tissue baseline  NEURO:   Cranial nerves 2-12 are normal tested and grossly at patient's baseline, Examination of sensation by touch normal  PSYCH:  oriented to self, memory impaired       Patient is on hospice/palliative care and labs are not recommended      Assessment/Plan:    (F03.C4) Severe dementia with anxiety, unspecified dementia type (H)  (primary encounter diagnosis)  (F51.01) Primary insomnia  Comment: Chronic Alzheimer's dementia with anxiety " associated behaviors/ongoing wandering, insomnia, hx depression. Staff report wandering affecting ability to sit down for meals. Admitted to [place] hospice services on 5/6/24.   Plan:   - Add schedule ativan 0.5mg at bedtime dx insomnia, anxiety  - Continue ativan 0.5mg every 4hrs PRN dx anxiety, agitation  - Continue buspar TID, remeron, melatonin, sertraline  - Continue comfort meds PRN  - Monitor changes in mood or behaviors  - Continue supportive services at [facility] memory care unit  - [place] hospice team following, goals of care are comfort focused  - Reviewed patient status and treatment plan with Randy (spouse)     (K59.01) Slow transit constipation  Comment: Controlled with intermittent loose stools  Plan:   - Decrease senna to 1 tab MWF; hold if loose stools dx constipation  - Continue senna 1 tab every day PRN dx constipation      Orders:  - Decrease senna to 1 tab MWF; hold if loose stools dx constipation  - Continue senna 1 tab every day PRN dx constipation  - Add schedule ativan 0.5mg at bedtime dx insomnia, anxiety  - Continue ativan 0.5mg every 4hrs PRN dx anxiety, agitation       Electronically signed by: CATIA Dougherty CNP

## 2024-05-20 NOTE — PROGRESS NOTES
Encounter opened in error. Please disregard.     Dr. Ledy Strange, APRN, DNP, AGNP-BC, PMHNP-BC  Office Hours: Tues-Fri 1534-3016  [facility]  Office: [address] [place] [address]  Fax - [phone]  Office/Triage Phone - [phone]  Email: [email or web address]

## 2024-05-26 RX ORDER — BISACODYL 10 MG
10 SUPPOSITORY, RECTAL RECTAL DAILY PRN
COMMUNITY
Start: 2024-05-26

## 2024-06-20 VITALS
DIASTOLIC BLOOD PRESSURE: 58 MMHG | RESPIRATION RATE: 18 BRPM | WEIGHT: 107.4 LBS | OXYGEN SATURATION: 97 % | HEART RATE: 87 BPM | TEMPERATURE: 97.2 F | SYSTOLIC BLOOD PRESSURE: 118 MMHG | BODY MASS INDEX: 17.26 KG/M2 | HEIGHT: 66 IN

## 2024-06-20 NOTE — PROGRESS NOTES
[facility] GERIATRICS    PRIMARY CARE PROVIDER AND CLINIC:  CATIA Dougherty CNP, [address] [facility] [id number]  Chief Complaint   Patient presents with    [facility]      [place] Medical Record Number:  [id number]  Place of Service where encounter took place:  [facility])([facility])     Joyce Figueroa  is a 73 year old  (1950), living in above facility since 3/27/23 and now choosing to change PCPs to [facility]. .     Patient remains on [place] hospice with diagnosis of dementia    She is essentially nonverbal, frequently paces in the [place].  She has been started on low-dose lorazepam which may be somewhat helpful      CODE STATUS/ADVANCE DIRECTIVES DISCUSSION:  No CPR- Do NOT Intubate    ALLERGIES:   Allergies   Allergen Reactions    Escitalopram Nausea      PAST MEDICAL HISTORY:   Past Medical History:   Diagnosis Date    Alzheimer's type dementia with late onset without behavioral disturbance (H) 06/02/2020    Formatting of this note might be different from the original.   Alzheimer type dementia, late onset with anxiety.  MOCA 15/30 August 2022. She has a several year history of cognitive and memory decline which has worsened to the point where she can no longer perform activities that she used to do easily such as use of the television, microwave, and computer.  She remains independent of ADLs with th    Anxiety 05/16/2019    Formatting of this note might be different from the original.   Did not tolerate escitalopram   Significant travel anxiety      Last Assessment & Plan:    Formatting of this note might be different from the original.   Increased travel anxiety. Anxiety adequately managed most of time   - may increase buspirone to 5-10 mg every 8 hours as needed for anxiety. Currently taking 5 mg twice daily schedu    Dysphagia 08/17/2022    Formatting of this note might be different from the original.   History of benign esophageal stenosis s/p dilation in past       Last Assessment & Plan:    Formatting of this note might be different from the original.   Occasionally coughs when eating. If this progresses, advise swallow evaluation. Chew food thoroughly.  Favor thickened over liquid foods.    GERD (gastroesophageal reflux disease) 11/03/2014    Formatting of this note might be different from the original.   Impression - [id number]: Abdominal symptoms have improved; Impression - 56Bhd9257: well controlled with lifestyle changes and as needed ranitidine; Impression - 82Sld1470: takes med PRn after making lifestyle changes; now takes it only every 2 weeks; Impression - 90Qrx5971: decrease prevacid to 20    Hyperlipidemia 08/10/2013    Last Assessment & Plan:    Formatting of this note might be different from the original.   Managed with atorvastatin 40 mg once daily. Check cholesterol with labs today    Loss of smell 06/02/2016    Formatting of this note might be different from the original.   Impression - 94Dmk1254: We discussed the differential diagnosis for this.  She declined an UPSIT test today.  She was advised on safety issues in the home (CO/smoke detectors).    Mixed conductive and sensorineural hearing loss of left ear 10/17/2016    Moderate episode of recurrent major depressive disorder (H) 05/16/2022    Last Assessment & Plan:    Formatting of this note might be different from the original.   Sounds like sertraline to 50 mg once daily has been beneficial. Continue at same dosing.    Otosclerosis, left 06/02/2016    Formatting of this note might be different from the original.   Impression - 80Ppz9917: Her hearing is stable.  She was advised to get a new hearing aid for the left ear.; Impression - 36Gib4074: She has not had surgery for this.  She did have an exploratory tympanotomy in the past.  She currently wears a hearing aid and we will plan to retest her hearing the next time she comes in, assuming the i    Pelvic congestion 10/13/2021    Last Assessment & Plan:     Formatting of this note might be different from the original.   We discussed finding on CT. Joyce is not having any convincing symptoms to correlate. She and her  elect to monitor at this time. I agree.    Primary hypertension 09/29/2021    Last Assessment & Plan:    Formatting of this note might be different from the original.   Blood pressure looks great. Continue amlodipine 2.5 mg once daily. Screening labs updated today    RUQ pain 05/02/2023    Last Assessment & Plan:    Formatting of this note might be different from the original.   Reports acute RUQ pain today. Quite sensitive on exam. Had been using MiraLAX daily for constipation but not recently. Possible constipation although has been regular by report.   - abdominal film and labs today   - restart MiraLAX one serving once daily with a full glass of water      PAST SURGICAL HISTORY:   has no past surgical history on file.  FAMILY HISTORY: family history includes Alzheimer Disease in her mother and sister; Bladder Cancer in her brother; Hyperlipidemia in her father.  SOCIAL HISTORY:   reports that she has never smoked. She has never used smokeless tobacco. She reports that she does not currently use alcohol. She reports that she does not use drugs.  Patient's living condition: lives in an assisted living facility    Current medications were reviewed by me today    Current Outpatient Medications   Medication Sig Dispense Refill    acetaminophen (TYLENOL) 500 MG tablet Take 2 tablets (1,000 mg) by mouth 3 times daily. May also take 2 tablets (1,000 mg) nightly as needed for mild pain. 120 tablet 11    bisacodyl (DULCOLAX) 10 MG suppository Place 1 suppository (10 mg) rectally daily as needed for constipation      busPIRone (BUSPAR) 10 MG tablet Take 1 tablet (10 mg) by mouth 3 times daily 90 tablet 11    diclofenac (VOLTAREN) 1 % topical gel Apply 4 g topically 3 times daily May also apply daily PRN      haloperidol (HALDOL) 0.5 MG tablet Take 1  "tablet (0.5 mg) by mouth every 4 hours as needed for agitation      HYDROmorphone (DILAUDID) 0.5 MG solutab Place 2 tablets (1 mg) under the tongue every hour as needed for pain or shortness of breath      hyoscyamine (LEVSIN/SL) 0.125 MG sublingual tablet Place 1 tablet (0.125 mg) under the tongue every 4 hours as needed for cramping      LORazepam (ATIVAN) 0.5 MG tablet Take 1 tablet (0.5 mg) by mouth at bedtime. May also take 1 tablet (0.5 mg) every 4 hours as needed for anxiety. 45 tablet 0    melatonin 3 MG tablet Take 1 tablet (3 mg) by mouth at bedtime 30 tablet 11    mirtazapine (REMERON) 15 MG tablet Take 1 tablet (15 mg) by mouth at bedtime 30 tablet 11    sennosides (SENOKOT) 8.6 MG tablet Take 1 tablet by mouth three times a week. May also take 1 tablet daily as needed for constipation. 60 tablet 11    sertraline (ZOLOFT) 50 MG tablet Take 1 tablet by mouth daily       No current facility-administered medications for this visit.       ROS:  Unobtainable secondary to dementia    Vitals:  /58   Pulse 87   Temp 97.2  F (36.2  C)   Resp 18   Ht 1.676 m (5' 6\")   Wt 48.7 kg (107 lb 6.4 oz)   SpO2 97%   BMI 17.33 kg/m    Exam:  Alert, standing in the room.  Frail-appearing  Nonverbal  Lungs clear  CV regular rhythm  No tremor or rigidity      ASSESSMENT/PLAN:    Advanced dementia with anxiety, frequent pacing, malnutrition    Plan: Continue comfort cares with hospice                "

## 2024-06-21 ENCOUNTER — ASSISTED LIVING VISIT (OUTPATIENT)
Dept: GERIATRICS | Facility: CLINIC | Age: 74
End: 2024-06-21
Payer: MEDICARE

## 2024-06-21 DIAGNOSIS — F03.C4 SEVERE DEMENTIA WITH ANXIETY, UNSPECIFIED DEMENTIA TYPE (H): Primary | ICD-10-CM

## 2024-06-21 DIAGNOSIS — E43 SEVERE PROTEIN-CALORIE MALNUTRITION (H): ICD-10-CM

## 2024-06-21 PROCEDURE — 99342 HOME/RES VST NEW LOW MDM 30: CPT | Mod: GV | Performed by: INTERNAL MEDICINE

## 2024-06-21 NOTE — LETTER
6/21/2024      Joyce Figueroa  C/o Randy Figueroa  [address] [address]        [facility] GERIATRICS    PRIMARY CARE PROVIDER AND CLINIC:  CATIA Dougherty CNP, [address] [place] [address]  Chief Complaint   Patient presents with     [facility] Medical Record Number:  [id number]  Place of Service where encounter took place:  [facility])     Joyce Figueroa  is a 73 year old  (1950), living in above facility since 3/27/23 and now choosing to change PCPs to [facility]. .     Patient remains on [facility] with diagnosis of dementia    She is essentially nonverbal, frequently paces in the [place].  She has been started on low-dose lorazepam which may be somewhat helpful      CODE STATUS/ADVANCE DIRECTIVES DISCUSSION:  No CPR- Do NOT Intubate    ALLERGIES:   Allergies   Allergen Reactions     Escitalopram Nausea      PAST MEDICAL HISTORY:   Past Medical History:   Diagnosis Date     Alzheimer's type dementia with late onset without behavioral disturbance (H) 06/02/2020    Formatting of this note might be different from the original.   Alzheimer type dementia, late onset with anxiety.  MOCA 15/30 August 2022. She has a several year history of cognitive and memory decline which has worsened to the point where she can no longer perform activities that she used to do easily such as use of the television, microwave, and computer.  She remains independent of ADLs with th     Anxiety 05/16/2019    Formatting of this note might be different from the original.   Did not tolerate escitalopram   Significant travel anxiety      Last Assessment & Plan:    Formatting of this note might be different from the original.   Increased travel anxiety. Anxiety adequately managed most of time   - may increase buspirone to 5-10 mg every 8 hours as needed for anxiety. Currently taking 5 mg twice daily schedu     Dysphagia 08/17/2022    Formatting of  this note might be different from the original.   History of benign esophageal stenosis s/p dilation in past      Last Assessment & Plan:    Formatting of this note might be different from the original.   Occasionally coughs when eating. If this progresses, advise swallow evaluation. Chew food thoroughly.  Favor thickened over liquid foods.     GERD (gastroesophageal reflux disease) 11/03/2014    Formatting of this note might be different from the original.   Impression - [id number]: Abdominal symptoms have improved; Impression - 05Cgg4512: well controlled with lifestyle changes and as needed ranitidine; Impression - 38Czz8644: takes med PRn after making lifestyle changes; now takes it only every 2 weeks; Impression - 48Lxr6165: decrease prevacid to 20     Hyperlipidemia 08/10/2013    Last Assessment & Plan:    Formatting of this note might be different from the original.   Managed with atorvastatin 40 mg once daily. Check cholesterol with labs today     Loss of smell 06/02/2016    Formatting of this note might be different from the original.   Impression - 32Bjj9968: We discussed the differential diagnosis for this.  She declined an UPSIT test today.  She was advised on safety issues in the home (CO/smoke detectors).     Mixed conductive and sensorineural hearing loss of left ear 10/17/2016     Moderate episode of recurrent major depressive disorder (H) 05/16/2022    Last Assessment & Plan:    Formatting of this note might be different from the original.   Sounds like sertraline to 50 mg once daily has been beneficial. Continue at same dosing.     Otosclerosis, left 06/02/2016    Formatting of this note might be different from the original.   Impression - 35Uru7184: Her hearing is stable.  She was advised to get a new hearing aid for the left ear.; Impression - 61Hzc4832: She has not had surgery for this.  She did have an exploratory tympanotomy in the past.  She currently wears a hearing aid and we will plan to  retest her hearing the next time she comes in, assuming the i     Pelvic congestion 10/13/2021    Last Assessment & Plan:    Formatting of this note might be different from the original.   We discussed finding on CT. Joyce is not having any convincing symptoms to correlate. She and her  elect to monitor at this time. I agree.     Primary hypertension 09/29/2021    Last Assessment & Plan:    Formatting of this note might be different from the original.   Blood pressure looks great. Continue amlodipine 2.5 mg once daily. Screening labs updated today     RUQ pain 05/02/2023    Last Assessment & Plan:    Formatting of this note might be different from the original.   Reports acute RUQ pain today. Quite sensitive on exam. Had been using MiraLAX daily for constipation but not recently. Possible constipation although has been regular by report.   - abdominal film and labs today   - restart MiraLAX one serving once daily with a full glass of water      PAST SURGICAL HISTORY:   has no past surgical history on file.  FAMILY HISTORY: family history includes Alzheimer Disease in her mother and sister; Bladder Cancer in her brother; Hyperlipidemia in her father.  SOCIAL HISTORY:   reports that she has never smoked. She has never used smokeless tobacco. She reports that she does not currently use alcohol. She reports that she does not use drugs.  Patient's living condition: lives in an assisted living facility    Current medications were reviewed by me today    Current Outpatient Medications   Medication Sig Dispense Refill     acetaminophen (TYLENOL) 500 MG tablet Take 2 tablets (1,000 mg) by mouth 3 times daily. May also take 2 tablets (1,000 mg) nightly as needed for mild pain. 120 tablet 11     bisacodyl (DULCOLAX) 10 MG suppository Place 1 suppository (10 mg) rectally daily as needed for constipation       busPIRone (BUSPAR) 10 MG tablet Take 1 tablet (10 mg) by mouth 3 times daily 90 tablet 11     diclofenac  "(VOLTAREN) 1 % topical gel Apply 4 g topically 3 times daily May also apply daily PRN       haloperidol (HALDOL) 0.5 MG tablet Take 1 tablet (0.5 mg) by mouth every 4 hours as needed for agitation       HYDROmorphone (DILAUDID) 0.5 MG solutab Place 2 tablets (1 mg) under the tongue every hour as needed for pain or shortness of breath       hyoscyamine (LEVSIN/SL) 0.125 MG sublingual tablet Place 1 tablet (0.125 mg) under the tongue every 4 hours as needed for cramping       LORazepam (ATIVAN) 0.5 MG tablet Take 1 tablet (0.5 mg) by mouth at bedtime. May also take 1 tablet (0.5 mg) every 4 hours as needed for anxiety. 45 tablet 0     melatonin 3 MG tablet Take 1 tablet (3 mg) by mouth at bedtime 30 tablet 11     mirtazapine (REMERON) 15 MG tablet Take 1 tablet (15 mg) by mouth at bedtime 30 tablet 11     sennosides (SENOKOT) 8.6 MG tablet Take 1 tablet by mouth three times a week. May also take 1 tablet daily as needed for constipation. 60 tablet 11     sertraline (ZOLOFT) 50 MG tablet Take 1 tablet by mouth daily       No current facility-administered medications for this visit.       ROS:  Unobtainable secondary to dementia    Vitals:  /58   Pulse 87   Temp 97.2  F (36.2  C)   Resp 18   Ht 1.676 m (5' 6\")   Wt 48.7 kg (107 lb 6.4 oz)   SpO2 97%   BMI 17.33 kg/m    Exam:  Alert, standing in the room.  Frail-appearing  Nonverbal  Lungs clear  CV regular rhythm  No tremor or rigidity      ASSESSMENT/PLAN:    Advanced dementia with anxiety, frequent pacing, malnutrition    Plan: Continue comfort cares with hospice                  Sincerely,        Dedrick Duncan MD      "

## 2024-07-12 ENCOUNTER — TELEPHONE (OUTPATIENT)
Dept: GERIATRICS | Facility: CLINIC | Age: 74
End: 2024-07-12
Payer: COMMERCIAL

## 2024-07-12 NOTE — TELEPHONE ENCOUNTER
[facility] NON-EMERGENT ENCOUNTER    Joyce Figueroa is a 73 year old  (1950)    Disposition  Hospice patient - Pt has been having loose stools after drinking her nutritional supplement. Pt is eating more meals than previously. Requesting supplement discontinued.    YORDY provided to Jennifer RAMOS/VIVEK:   Discontinue nutritional supplement      Electronically signed by:   Gena Hammond RN

## 2024-07-22 ENCOUNTER — DOCUMENTATION ONLY (OUTPATIENT)
Dept: GERIATRICS | Facility: CLINIC | Age: 74
End: 2024-07-22
Payer: COMMERCIAL

## 2024-07-23 NOTE — PROGRESS NOTES
[facility]   2024     Name: Joyce Figueroa   : 1950       Orders:  - Referral to [facility] hospice dx dementia, malnutrition        Electronically signed by   CATIA Dougherty CNP on 2024 at 8:35 PM

## 2024-07-31 ENCOUNTER — ASSISTED LIVING VISIT (OUTPATIENT)
Dept: GERIATRICS | Facility: CLINIC | Age: 74
End: 2024-07-31
Payer: OTHER MISCELLANEOUS

## 2024-07-31 VITALS
BODY MASS INDEX: 16.94 KG/M2 | TEMPERATURE: 98.2 F | HEART RATE: 68 BPM | OXYGEN SATURATION: 97 % | RESPIRATION RATE: 18 BRPM | DIASTOLIC BLOOD PRESSURE: 68 MMHG | HEIGHT: 66 IN | SYSTOLIC BLOOD PRESSURE: 138 MMHG | WEIGHT: 105.4 LBS

## 2024-07-31 DIAGNOSIS — F03.918 WANDERING BEHAVIOR DUE TO DEMENTIA (H): ICD-10-CM

## 2024-07-31 DIAGNOSIS — Z51.5 HOSPICE CARE PATIENT: ICD-10-CM

## 2024-07-31 DIAGNOSIS — F51.01 PRIMARY INSOMNIA: ICD-10-CM

## 2024-07-31 DIAGNOSIS — E43 SEVERE PROTEIN-CALORIE MALNUTRITION (H): ICD-10-CM

## 2024-07-31 DIAGNOSIS — Z91.83 WANDERING BEHAVIOR DUE TO DEMENTIA (H): ICD-10-CM

## 2024-07-31 DIAGNOSIS — F03.C4 SEVERE DEMENTIA WITH ANXIETY, UNSPECIFIED DEMENTIA TYPE (H): Primary | ICD-10-CM

## 2024-07-31 PROCEDURE — 99350 HOME/RES VST EST HIGH MDM 60: CPT | Performed by: NURSE PRACTITIONER

## 2024-07-31 PROCEDURE — 99417 PROLNG OP E/M EACH 15 MIN: CPT | Performed by: NURSE PRACTITIONER

## 2024-07-31 RX ORDER — MORPHINE SULFATE 30 MG/1
5 TABLET ORAL
COMMUNITY
Start: 2024-07-31

## 2024-07-31 RX ORDER — BUSPIRONE HYDROCHLORIDE 7.5 MG/1
7.5 TABLET ORAL 3 TIMES DAILY
COMMUNITY
Start: 2024-07-28 | End: 2024-07-31

## 2024-07-31 RX ORDER — LORAZEPAM 0.5 MG/1
TABLET ORAL
COMMUNITY
Start: 2024-07-31 | End: 2024-08-01 | Stop reason: DRUGHIGH

## 2024-07-31 RX ORDER — BUSPIRONE HYDROCHLORIDE 10 MG/1
10 TABLET ORAL 3 TIMES DAILY
COMMUNITY
Start: 2024-07-31
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
## 2024-08-01 RX ORDER — SERTRALINE HYDROCHLORIDE 25 MG/1
25 TABLET, FILM COATED ORAL DAILY
COMMUNITY
Start: 2024-08-01

## 2024-08-01 RX ORDER — LORAZEPAM 0.5 MG/1
0.5 TABLET ORAL AT BEDTIME
COMMUNITY

## 2024-08-01 RX ORDER — TRAZODONE HYDROCHLORIDE 50 MG/1
25 TABLET, FILM COATED ORAL 2 TIMES DAILY
COMMUNITY
Start: 2024-08-01

## 2024-08-01 NOTE — PROGRESS NOTES
[facility] Geriatrics   2024     Name: Joyce Figueroa   : 1950       Orders:  - Discontinue buspar 7.5mg TID  - Continue buspar 10mg TID dx anxiety due dementia   - Decrease ativan to 0.5mg at bedtime and continue ativan 0.5mg every 2hrs PRN dx anxiety  - Add trazodone 25mg BID at breakfast and lunch dx anxiety with dementia  - Decrease sertraline 25mg every day dx anxiety due to dementia        Electronically signed by   CATIA Dougherty CNP on 2024 at 8:11 AM

## 2024-08-13 VITALS
BODY MASS INDEX: 16.91 KG/M2 | HEART RATE: 58 BPM | SYSTOLIC BLOOD PRESSURE: 118 MMHG | WEIGHT: 105.2 LBS | TEMPERATURE: 98.2 F | DIASTOLIC BLOOD PRESSURE: 63 MMHG | OXYGEN SATURATION: 95 % | RESPIRATION RATE: 22 BRPM | HEIGHT: 66 IN
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
## 2024-08-14 ENCOUNTER — ASSISTED LIVING VISIT (OUTPATIENT)
Dept: GERIATRICS | Facility: CLINIC | Age: 74
End: 2024-08-14
Payer: COMMERCIAL

## 2024-08-14 DIAGNOSIS — F03.C4 SEVERE DEMENTIA WITH ANXIETY, UNSPECIFIED DEMENTIA TYPE (H): Primary | ICD-10-CM

## 2024-08-14 DIAGNOSIS — E43 SEVERE PROTEIN-CALORIE MALNUTRITION (H): ICD-10-CM

## 2024-08-14 DIAGNOSIS — F51.01 PRIMARY INSOMNIA: ICD-10-CM

## 2024-08-14 DIAGNOSIS — F03.918 WANDERING BEHAVIOR DUE TO DEMENTIA (H): ICD-10-CM

## 2024-08-14 DIAGNOSIS — Z51.5 HOSPICE CARE PATIENT: ICD-10-CM

## 2024-08-14 DIAGNOSIS — Z91.83 WANDERING BEHAVIOR DUE TO DEMENTIA (H): ICD-10-CM

## 2024-08-14 PROCEDURE — 99349 HOME/RES VST EST MOD MDM 40: CPT | Performed by: NURSE PRACTITIONER

## 2024-08-14 NOTE — LETTER
" 8/14/2024      Joyce Figueroa  C/o Randy Figueroa  [address] [address]        [facility] GERIATRICS    Chief Complaint   Patient presents with    RECHECK     HPI:  Joyce Figueroa is a 74 year old  (1950), who is being seen today for an episodic care visit at: [facility])(FGS) [[id number]].       Today's concern is:     RN staff report since medication adjustments, patient has been having more behaviors with cares. Otherwise, appears less anxious during the day. Continues to wander and has difficulty sitting for long periods of time.          Allergies, and PMH/PSH reviewed in [facility] today.    REVIEW OF SYSTEMS:  Limited secondary to cognitive impairment but today pt reports no concerns      Objective:   /63   Pulse 58   Temp 98.2  F (36.8  C)   Resp 22   Ht 1.676 m (5' 6\")   Wt 47.7 kg (105 lb 3.2 oz)   SpO2 95%   BMI 16.98 kg/m    GENERAL APPEARANCE:  Alert, in no distress, thin, cooperative, wears glasses, expressive aphasia  RESP: no respiratory distress  CV:  no edema  M/S:  Ambulates without assistive device.   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 Chronic wandering behaviors. Insomnia controlled. Speech improved, more interactive during conversations. Chronic malnutrition, appetite variable. Body " mass index is 16.98 kg/m .  Hospice care patient, followed by [facility] hospice.    Plan:   - Change administration time of morning medications to 0700  - Continue trazodone 25mg BID, consider increasing to 50mg BID if ongoing behaviors w/cares  - Continue buspar 10mg TID, ativan 0.5mg at bedtime, sertraline 25mg every day   - Continue melatonin, remeron, comfort meds PRN  - Monitor changes in mood or behaviors. Continue safety checks and SBA w/activity. Continue to offer snacks/hand-held food throughout the day  - [facility] hospice team following, goals of care are comfort focused  UPDATE:  - Reviewed patient status and treatment plan with Tye (son).       Orders:  - Change administration time of morning medications to 0700        Electronically signed by: CATIA Dougherty CNP              [facility]   August 15, 2024     Name: Joyce Figueroa   : 1950       Background:  Behaviors/hitting with morning cares reported by RA staff       Orders:  - Change administration time of morning medications to 0700. Give morning medications prior to starting morning cares; wait at least 60 minutes.        Electronically signed by  CATIA Dougherty CNP on 8/15/2024 at 5:09 PM           Sincerely,        CATIA Dougherty CNP

## 2024-08-15 NOTE — PROGRESS NOTES
[facility]   August 15, 2024     Name: Joyce Figueroa   : 1950       Background:  Behaviors/hitting with morning cares reported by RA staff       Orders:  - Change administration time of morning medications to 0700. Give morning medications prior to starting morning cares; wait at least 60 minutes.        Electronically signed by  CATIA Dougherty CNP on 8/15/2024 at 5:09 PM
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
## 2024-10-29 VITALS
DIASTOLIC BLOOD PRESSURE: 91 MMHG | HEART RATE: 79 BPM | OXYGEN SATURATION: 96 % | TEMPERATURE: 97.3 F | RESPIRATION RATE: 22 BRPM | BODY MASS INDEX: 17.1 KG/M2 | HEIGHT: 66 IN | SYSTOLIC BLOOD PRESSURE: 159 MMHG | WEIGHT: 106.4 LBS

## 2024-10-30 ENCOUNTER — ASSISTED LIVING VISIT (OUTPATIENT)
Dept: GERIATRICS | Facility: CLINIC | Age: 74
End: 2024-10-30
Payer: COMMERCIAL

## 2024-10-30 DIAGNOSIS — Z91.83 WANDERING BEHAVIOR DUE TO DEMENTIA (H): ICD-10-CM

## 2024-10-30 DIAGNOSIS — Z51.5 HOSPICE CARE PATIENT: ICD-10-CM

## 2024-10-30 DIAGNOSIS — F51.01 PRIMARY INSOMNIA: ICD-10-CM

## 2024-10-30 DIAGNOSIS — F03.918 WANDERING BEHAVIOR DUE TO DEMENTIA (H): ICD-10-CM

## 2024-10-30 DIAGNOSIS — F03.C4 SEVERE DEMENTIA WITH ANXIETY, UNSPECIFIED DEMENTIA TYPE (H): Primary | ICD-10-CM

## 2024-10-30 DIAGNOSIS — E43 SEVERE PROTEIN-CALORIE MALNUTRITION (H): ICD-10-CM

## 2024-10-30 PROCEDURE — 99349 HOME/RES VST EST MOD MDM 40: CPT | Performed by: NURSE PRACTITIONER
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
## 2024-11-12 RX ORDER — LOPERAMIDE HYDROCHLORIDE 2 MG/1
2 TABLET ORAL PRN
COMMUNITY
Start: 2024-11-12

## 2024-11-26 ENCOUNTER — ASSISTED LIVING VISIT (OUTPATIENT)
Dept: GERIATRICS | Facility: CLINIC | Age: 74
End: 2024-11-26
Payer: COMMERCIAL

## 2024-11-26 VITALS
DIASTOLIC BLOOD PRESSURE: 66 MMHG | SYSTOLIC BLOOD PRESSURE: 86 MMHG | WEIGHT: 106.4 LBS | BODY MASS INDEX: 17.1 KG/M2 | RESPIRATION RATE: 14 BRPM | TEMPERATURE: 97.6 F | HEIGHT: 66 IN | OXYGEN SATURATION: 93 % | HEART RATE: 56 BPM

## 2024-11-26 DIAGNOSIS — F03.C4 SEVERE DEMENTIA WITH ANXIETY, UNSPECIFIED DEMENTIA TYPE (H): Primary | ICD-10-CM

## 2024-11-26 DIAGNOSIS — Z51.5 HOSPICE CARE PATIENT: ICD-10-CM
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
## 2024-11-26 DIAGNOSIS — R40.0 SOMNOLENCE: ICD-10-CM

## 2024-11-26 PROCEDURE — 99349 HOME/RES VST EST MOD MDM 40: CPT | Mod: GV | Performed by: NURSE PRACTITIONER

## 2024-11-26 NOTE — LETTER
11/26/2024      Joyce Figueroa  C/o aRndy Figueroa  [address] [facility] [address]        No notes on file      Sincerely,        CATIA Dougherty CNP       change in condition today with increased somnolence and decreased appetite. No pain associated behaviors. Severe dementia. Likely progression towards end of life. Hospice care patient, followed by [facility] since 7/27/24.    Plan:   - Discontinue buspar, senna, sertraline, remeron, trazodone, tylenol, diclofenac, melatonin  - Change ativan to 0.5mg BID (8am, 2PM), and 1mg at bedtime dx anxiety with dementia   - Continue comfort meds PRN  - Discussed patient status and changes to treatment plan with [facility] hosp[facility] RN  - Reviewed patient status and treatment plan with Tye (son) and Randy (spouse), verbalizes understanding and agrees with treatment plan      Orders:  - Discontinue buspar, senna, sertraline, remeron, trazodone, tylenol, diclofenac, melatonin  - Change ativan to 0.5mg BID (8am, 2PM), and 1mg at bedtime dx anxiety with dementia       Electronically signed by: CATIA Dougherty CNP           Sincerely,        CATIA Dougherty CNP

## 2024-11-26 NOTE — PROGRESS NOTES
"[facility] GERIATRICS    Chief Complaint   Patient presents with    RECHECK     HPI:  Joyce Figueroa is a 74 year old  (1950), who is being seen today for an episodic care visit at: [facility])([facility]) [[id number]].       Today's concern is: ***        Allergies, and PMH/PSH reviewed in [facility] today.    REVIEW OF SYSTEMS:  {kpentt67:765281}      Objective:   BP (!) 86/66   Pulse 56   Temp 97.6  F (36.4  C)   Resp 14   Ht 1.676 m (5' 6\")   Wt 48.3 kg (106 lb 6.4 oz)   SpO2 93%   BMI 17.17 kg/m    {Nursing home physical exam :016087}      Recent labs in [facility] reviewed by me today.  and Most Recent 3 CBC's:  Recent Labs   Lab Test 02/14/24  1120   WBC 6.7   HGB 12.0   MCV 94        Most Recent 3 BMP's:  Recent Labs   Lab Test 02/14/24  1120      POTASSIUM 4.5   CHLORIDE 105   CO2 27   BUN 14.2   CR 0.78   ANIONGAP 11   LARRY 9.6   *         Assessment/Plan:    {[facility] DX2:940030}        Orders:  - Discontinue buspar, senna, sertraline, remeron, trazodone, tylenol, diclofenac, melatonin  - Change ativan to 0.5mg BID (8am, 2PM), and 1mg at bedtime dx anxiety with dementia       Electronically signed by: CATIA Dougherty CNP       " dementia. Likely progression towards end of life. Hospice care patient, followed by [facility] since 7/27/24.    Plan:   - Discontinue buspar, senna, sertraline, remeron, trazodone, tylenol, diclofenac, melatonin  - Change ativan to 0.5mg BID (8am, 2PM), and 1mg at bedtime dx anxiety with dementia   - Continue comfort meds PRN  - Discussed patient status and changes to treatment plan with [facility] hosp[facility] RN  - Reviewed patient status and treatment plan with Tye (son) and Randy (spouse), verbalizes understanding and agrees with treatment plan      Orders:  - Discontinue buspar, senna, sertraline, remeron, trazodone, tylenol, diclofenac, melatonin  - Change ativan to 0.5mg BID (8am, 2PM), and 1mg at bedtime dx anxiety with dementia       Electronically signed by: CATIA Dougherty CNP